# Patient Record
Sex: MALE | Race: WHITE | Employment: OTHER | URBAN - METROPOLITAN AREA
[De-identification: names, ages, dates, MRNs, and addresses within clinical notes are randomized per-mention and may not be internally consistent; named-entity substitution may affect disease eponyms.]

---

## 2017-09-07 ENCOUNTER — OFFICE VISIT (OUTPATIENT)
Dept: SURGERY | Age: 62
End: 2017-09-07

## 2017-09-07 VITALS
HEART RATE: 49 BPM | DIASTOLIC BLOOD PRESSURE: 64 MMHG | SYSTOLIC BLOOD PRESSURE: 151 MMHG | HEIGHT: 72 IN | BODY MASS INDEX: 42.39 KG/M2 | WEIGHT: 313 LBS

## 2017-09-07 DIAGNOSIS — E11.69 UNCONTROLLED TYPE 2 DIABETES MELLITUS WITH OTHER SPECIFIED COMPLICATION, UNSPECIFIED LONG TERM INSULIN USE STATUS: Primary | ICD-10-CM

## 2017-09-07 DIAGNOSIS — G47.33 OBSTRUCTIVE SLEEP APNEA SYNDROME: ICD-10-CM

## 2017-09-07 DIAGNOSIS — E11.65 UNCONTROLLED TYPE 2 DIABETES MELLITUS WITH OTHER SPECIFIED COMPLICATION, UNSPECIFIED LONG TERM INSULIN USE STATUS: Primary | ICD-10-CM

## 2017-09-07 DIAGNOSIS — I10 ESSENTIAL HYPERTENSION: ICD-10-CM

## 2017-09-07 LAB
GLUCOSE BLD-MCNC: 130 MG/DL
HBA1C MFR BLD: 7.8 %

## 2017-09-07 PROCEDURE — 83036 HEMOGLOBIN GLYCOSYLATED A1C: CPT | Performed by: INTERNAL MEDICINE

## 2017-09-07 PROCEDURE — 99203 OFFICE O/P NEW LOW 30 MIN: CPT | Performed by: INTERNAL MEDICINE

## 2017-09-07 PROCEDURE — 3017F COLORECTAL CA SCREEN DOC REV: CPT | Performed by: INTERNAL MEDICINE

## 2017-09-07 PROCEDURE — 3046F HEMOGLOBIN A1C LEVEL >9.0%: CPT | Performed by: INTERNAL MEDICINE

## 2017-09-07 PROCEDURE — 1036F TOBACCO NON-USER: CPT | Performed by: INTERNAL MEDICINE

## 2017-09-07 PROCEDURE — G8427 DOCREV CUR MEDS BY ELIG CLIN: HCPCS | Performed by: INTERNAL MEDICINE

## 2017-09-07 PROCEDURE — G8417 CALC BMI ABV UP PARAM F/U: HCPCS | Performed by: INTERNAL MEDICINE

## 2017-09-07 PROCEDURE — 82962 GLUCOSE BLOOD TEST: CPT | Performed by: INTERNAL MEDICINE

## 2017-09-07 RX ORDER — FLUTICASONE PROPIONATE 50 MCG
1 SPRAY, SUSPENSION (ML) NASAL NIGHTLY
COMMUNITY

## 2017-09-07 RX ORDER — SENNA PLUS 8.6 MG/1
1 TABLET ORAL 3 TIMES DAILY
Status: ON HOLD | COMMUNITY
End: 2020-10-18 | Stop reason: ALTCHOICE

## 2017-09-07 RX ORDER — HYDROCHLOROTHIAZIDE 25 MG/1
25 TABLET ORAL 2 TIMES DAILY
COMMUNITY
End: 2019-10-04 | Stop reason: SDUPTHER

## 2017-09-10 ASSESSMENT — ENCOUNTER SYMPTOMS
EYES NEGATIVE: 1
VISUAL CHANGE: 0

## 2017-09-28 RX ORDER — CALCIUM CARB/VITAMIN D3/VIT K1 500-100-40
TABLET,CHEWABLE ORAL
Qty: 300 EACH | Refills: 3 | Status: SHIPPED | OUTPATIENT
Start: 2017-09-28 | End: 2019-10-07 | Stop reason: SDUPTHER

## 2017-10-11 DIAGNOSIS — Z79.4 TYPE 2 DIABETES MELLITUS WITHOUT COMPLICATION, WITH LONG-TERM CURRENT USE OF INSULIN (HCC): ICD-10-CM

## 2017-10-11 DIAGNOSIS — I10 HYPERTENSION: ICD-10-CM

## 2017-10-11 DIAGNOSIS — E11.9 TYPE 2 DIABETES MELLITUS WITHOUT COMPLICATION, WITH LONG-TERM CURRENT USE OF INSULIN (HCC): ICD-10-CM

## 2017-11-14 ENCOUNTER — OFFICE VISIT (OUTPATIENT)
Dept: SURGERY | Age: 62
End: 2017-11-14

## 2017-11-14 VITALS
HEIGHT: 72 IN | WEIGHT: 315 LBS | BODY MASS INDEX: 42.66 KG/M2 | SYSTOLIC BLOOD PRESSURE: 185 MMHG | HEART RATE: 56 BPM | DIASTOLIC BLOOD PRESSURE: 76 MMHG

## 2017-11-14 DIAGNOSIS — E11.65 UNCONTROLLED TYPE 2 DIABETES MELLITUS WITH OTHER SPECIFIED COMPLICATION, UNSPECIFIED LONG TERM INSULIN USE STATUS: ICD-10-CM

## 2017-11-14 DIAGNOSIS — E11.69 UNCONTROLLED TYPE 2 DIABETES MELLITUS WITH OTHER SPECIFIED COMPLICATION, UNSPECIFIED LONG TERM INSULIN USE STATUS: ICD-10-CM

## 2017-11-14 DIAGNOSIS — G47.33 OBSTRUCTIVE SLEEP APNEA SYNDROME: ICD-10-CM

## 2017-11-14 LAB
ANION GAP SERPL CALCULATED.3IONS-SCNC: 12 MEQ/L (ref 7–13)
BUN BLDV-MCNC: 21 MG/DL (ref 8–23)
CALCIUM SERPL-MCNC: 10.1 MG/DL (ref 8.6–10.2)
CHLORIDE BLD-SCNC: 99 MEQ/L (ref 98–107)
CO2: 22 MEQ/L (ref 22–29)
CREAT SERPL-MCNC: 0.84 MG/DL (ref 0.7–1.2)
GFR AFRICAN AMERICAN: >60
GFR NON-AFRICAN AMERICAN: >60
GLUCOSE BLD-MCNC: 266 MG/DL (ref 74–109)
GLUCOSE BLD-MCNC: 291 MG/DL
POTASSIUM SERPL-SCNC: 4.8 MEQ/L (ref 3.5–5.1)
SODIUM BLD-SCNC: 133 MEQ/L (ref 132–144)

## 2017-11-14 PROCEDURE — G8427 DOCREV CUR MEDS BY ELIG CLIN: HCPCS | Performed by: INTERNAL MEDICINE

## 2017-11-14 PROCEDURE — G8417 CALC BMI ABV UP PARAM F/U: HCPCS | Performed by: INTERNAL MEDICINE

## 2017-11-14 PROCEDURE — 3045F PR MOST RECENT HEMOGLOBIN A1C LEVEL 7.0-9.0%: CPT | Performed by: INTERNAL MEDICINE

## 2017-11-14 PROCEDURE — 99213 OFFICE O/P EST LOW 20 MIN: CPT | Performed by: INTERNAL MEDICINE

## 2017-11-14 PROCEDURE — 1036F TOBACCO NON-USER: CPT | Performed by: INTERNAL MEDICINE

## 2017-11-14 PROCEDURE — 82962 GLUCOSE BLOOD TEST: CPT | Performed by: INTERNAL MEDICINE

## 2017-11-14 PROCEDURE — 3017F COLORECTAL CA SCREEN DOC REV: CPT | Performed by: INTERNAL MEDICINE

## 2017-11-14 PROCEDURE — G8484 FLU IMMUNIZE NO ADMIN: HCPCS | Performed by: INTERNAL MEDICINE

## 2017-11-14 NOTE — PROGRESS NOTES
tablet, Take 25 mg by mouth 2 times daily, Disp: , Rfl:     senna (SENOKOT) 8.6 MG tablet, Take 1 tablet by mouth 3 times daily, Disp: , Rfl:     insulin NPH (HUMULIN N) 100 UNIT/ML injection vial, 70 units at bedtime, Disp: 3 vial, Rfl: 3    insulin regular (HUMULIN R) 100 UNIT/ML injection, 20 units at each meals, Disp: 4 vial, Rfl: 3    metFORMIN (GLUCOPHAGE) 1000 MG tablet, TAKE 1 TABLET BY MOUTH TWICE A DAY WITH MEALS , Disp: 60 tablet, Rfl: 4    ACCU-CHEK SMARTVIEW strip, Use to test 4 times daily. , Disp: 150 each, Rfl: 4    tamsulosin (FLOMAX) 0.4 MG capsule, Take 0.4 mg by mouth daily. , Disp: , Rfl:     allopurinol (ZYLOPRIM) 100 MG tablet, Take 100 mg by mouth daily. , Disp: , Rfl:     docusate sodium (COLACE) 100 MG capsule, Take 100 mg by mouth 2 times daily. , Disp: , Rfl:     vitamin D (ERGOCALCIFEROL) 92960 UNITS CAPS capsule, Take 50,000 Units by mouth once a week., Disp: , Rfl:     metoprolol (TOPROL-XL) 25 MG XL tablet, Take 25 mg by mouth daily. , Disp: , Rfl:     ACCU-CHEK FASTCLIX LANCETS MISC, Pt test 4x daily, Disp: 400 each, Rfl: 11    lisinopril (PRINIVIL;ZESTRIL) 20 MG tablet, Take 1 tablet by mouth daily. , Disp: 30 tablet, Rfl: 06    potassium chloride (KLOR-CON) 10 MEQ CR tablet, Take 10 mEq by mouth daily. , Disp: , Rfl:     sildenafil (VIAGRA) 50 MG tablet, Take 50 mg by mouth as needed. , Disp: , Rfl:     Review of Systems   Constitutional: Positive for fatigue. Genitourinary: Positive for impotence. Musculoskeletal: Positive for arthralgias. Neurological: Positive for dizziness. Psychiatric/Behavioral: Positive for sleep disturbance. The patient is nervous/anxious. All other systems reviewed and are negative.       Vitals:    11/14/17 1511 11/14/17 1519   BP: (!) 179/80 (!) 185/76   Site: Right Arm    Position: Sitting    Cuff Size: Large Adult    Pulse: 56    Weight: (!) 317 lb (143.8 kg)    Height: 6' (1.829 m)          Objective:   Physical Exam Constitutional: He is oriented to person, place, and time. He appears well-developed and well-nourished. HENT:   Head: Normocephalic and atraumatic. Cardiovascular: Normal rate. Abdominal:   Obese    Musculoskeletal:   Normal rom upper and lower extremity    Neurological: He is alert and oriented to person, place, and time. Skin: Skin is warm and dry. Psychiatric: He has a normal mood and affect. His behavior is normal.     Lab Results   Component Value Date     2017    K 4.8 2017    CL 99 2017    CO2 22 2017    BUN 21 2017    CREATININE 0.84 2017    GLUCOSE 291 2017    CALCIUM 10.1 2017    PROT 6.7 2013    LABALBU 4.1 2013    BILITOT 0.4 2013    ALKPHOS 80 2013    AST 16 2013    ALT 23 2013    LABGLOM >60.0 2017    GFRAA >60.0 2017    GLOB 2.5 2012         Assessment:      1.  Uncontrolled type 2 diabetes mellitus with other specified complication, with long-term current use of insulin ([facility] [address]  POCT Glucose           Plan:      Orders Placed This Encounter   Procedures    Basic Metabolic Panel     Standing Status:   Future     Standing Expiration Date:   2018    Hemoglobin A1C     Standing Status:   Future     Standing Expiration Date:   2018    Ambulatory referral to Pulmonology     Referral Priority:   Routine     Referral Type:   Consult for Advice and Opinion     Referral Reason:   Specialty Services Required     Referred to Provider:   Sanjana Rodriguez MD     Requested Specialty:   Pulmonology     Number of Visits Requested:   1    POCT Glucose     Increase insulin dose   Orders Placed This Encounter   Medications    insulin regular (NOVOLIN R) 100 UNIT/ML injection     Sig: Inject 30 units tid please give 6 vials for a 90 day supply     Dispense:  6 vial     Refill:  3    Insulin Degludec (TRESIBA FLEXTOUCH) 200 UNIT/ML SOPN     Si-100 units at bedtime     Dispense:  30 Pen     Refill:  03     continue  metformin 100 mg bid

## 2018-02-12 LAB
ANION GAP SERPL CALCULATED.3IONS-SCNC: 12 MEQ/L (ref 7–13)
BUN BLDV-MCNC: 24 MG/DL (ref 8–23)
CALCIUM SERPL-MCNC: 10.1 MG/DL (ref 8.6–10.2)
CHLORIDE BLD-SCNC: 103 MEQ/L (ref 98–107)
CO2: 24 MEQ/L (ref 22–29)
CREAT SERPL-MCNC: 0.95 MG/DL (ref 0.7–1.2)
GFR AFRICAN AMERICAN: >60
GFR NON-AFRICAN AMERICAN: >60
GLUCOSE BLD-MCNC: 114 MG/DL (ref 74–109)
HBA1C MFR BLD: 7.3 % (ref 4.8–5.9)
POTASSIUM SERPL-SCNC: 4.5 MEQ/L (ref 3.5–5.1)
SODIUM BLD-SCNC: 139 MEQ/L (ref 132–144)

## 2018-02-13 ENCOUNTER — OFFICE VISIT (OUTPATIENT)
Dept: ENDOCRINOLOGY | Age: 63
End: 2018-02-13
Payer: COMMERCIAL

## 2018-02-13 VITALS
BODY MASS INDEX: 42.66 KG/M2 | HEIGHT: 72 IN | DIASTOLIC BLOOD PRESSURE: 80 MMHG | SYSTOLIC BLOOD PRESSURE: 166 MMHG | HEART RATE: 60 BPM | WEIGHT: 315 LBS

## 2018-02-13 DIAGNOSIS — E29.1 HYPOGONADISM MALE: ICD-10-CM

## 2018-02-13 DIAGNOSIS — E11.42 DIABETIC POLYNEUROPATHY ASSOCIATED WITH TYPE 2 DIABETES MELLITUS (HCC): ICD-10-CM

## 2018-02-13 DIAGNOSIS — D35.01 ADENOMA OF RIGHT ADRENAL GLAND: ICD-10-CM

## 2018-02-13 DIAGNOSIS — E66.01 MORBID OBESITY (HCC): ICD-10-CM

## 2018-02-13 LAB — GLUCOSE BLD-MCNC: 169 MG/DL

## 2018-02-13 PROCEDURE — 3045F PR MOST RECENT HEMOGLOBIN A1C LEVEL 7.0-9.0%: CPT | Performed by: INTERNAL MEDICINE

## 2018-02-13 PROCEDURE — G8484 FLU IMMUNIZE NO ADMIN: HCPCS | Performed by: INTERNAL MEDICINE

## 2018-02-13 PROCEDURE — G8417 CALC BMI ABV UP PARAM F/U: HCPCS | Performed by: INTERNAL MEDICINE

## 2018-02-13 PROCEDURE — 99214 OFFICE O/P EST MOD 30 MIN: CPT | Performed by: INTERNAL MEDICINE

## 2018-02-13 PROCEDURE — 1036F TOBACCO NON-USER: CPT | Performed by: INTERNAL MEDICINE

## 2018-02-13 PROCEDURE — 3017F COLORECTAL CA SCREEN DOC REV: CPT | Performed by: INTERNAL MEDICINE

## 2018-02-13 PROCEDURE — G8427 DOCREV CUR MEDS BY ELIG CLIN: HCPCS | Performed by: INTERNAL MEDICINE

## 2018-02-13 PROCEDURE — 82962 GLUCOSE BLOOD TEST: CPT | Performed by: INTERNAL MEDICINE

## 2018-02-13 RX ORDER — GABAPENTIN 100 MG/1
300 CAPSULE ORAL 3 TIMES DAILY
Qty: 90 CAPSULE | Refills: 3 | Status: SHIPPED | OUTPATIENT
Start: 2018-02-13 | End: 2018-06-22 | Stop reason: SDUPTHER

## 2018-02-13 RX ORDER — GABAPENTIN 100 MG/1
200 CAPSULE ORAL 2 TIMES DAILY
COMMUNITY
End: 2018-02-13 | Stop reason: SDUPTHER

## 2018-02-13 NOTE — PROGRESS NOTES
Subjective:      Patient ID: Jono Sutton is a 57 y.o. male. Diabetes   He presents for his follow-up diabetic visit. He has type 2 diabetes mellitus. His disease course has been worsening. Hypoglycemia symptoms include dizziness and nervousness/anxiousness. Symptoms are worsening. Diabetic complications include impotence. Risk factors for coronary artery disease include diabetes mellitus, obesity, male sex and sedentary lifestyle. Current diabetic treatment includes insulin injections and oral agent (monotherapy) (treshiba novolin r plus metformin ). He is compliant with treatment most of the time (high cost). He is currently taking insulin pre-breakfast, pre-lunch, pre-dinner and at bedtime. Insulin injections are given by patient. He participates in exercise intermittently. His breakfast blood glucose range is generally >200 mg/dl. His highest blood glucose is >200 mg/dl. His overall blood glucose range is 180-200 mg/dl. (Lab Results       Component                Value               Date                       LABA1C                   7.3 (H)             02/12/2018              ) An ACE inhibitor/angiotensin II receptor blocker is being taken. Pt c/o increased sweating     Hypogonadism not on testosterone replacement        3.9 cm rt adrenal adenoma being followed by [facility] seen urologist     IMPRESSION:   1.  Nonobstructing bilateral renal stones. 2.  No evidence of obstructive renal stones, enhancing renal mass,   hydronephrosis or hydroureter. 3.  Small right renal cyst.  4.  Nonspecific mild diffuse wall thickening may represent a combination   of underdistention, muscular hypertrophy and infectious/inflammatory   etiologies. 5.  Fatty infiltration of the liver. 6.  3.9 cm right adrenal mass, most likely a lipid poor adrenal adenoma,   stable since 8/5/13.     Patient Active Problem List   Diagnosis    Type II diabetes mellitus, uncontrolled ([address]    Hypogonadism male   [facility] disturbance. The patient is nervous/anxious. All other systems reviewed and are negative. Vitals:    02/13/18 1609 02/13/18 1613   BP: (!) 180/66 (!) 166/80   Site: Left Arm Right Arm   Position: Sitting Sitting   Cuff Size: Large Adult Large Adult   Pulse: 60    Weight: (!) 315 lb (142.9 kg)    Height: 6' (1.829 m)          Objective:   Physical Exam   Constitutional: He appears well-developed and well-nourished. HENT:   Head: Normocephalic and atraumatic. Cardiovascular: Normal rate. Pulmonary/Chest: Effort normal.   Abdominal:   Obese    Neurological: He is alert. Skin: Skin is warm and dry. Psychiatric: He has a normal mood and affect. His behavior is normal.     Lab Results   Component Value Date     02/12/2018    K 4.5 02/12/2018     02/12/2018    CO2 24 02/12/2018    BUN 24 (H) 02/12/2018    CREATININE 0.95 02/12/2018    GLUCOSE 169 02/13/2018    CALCIUM 10.1 02/12/2018    PROT 6.7 04/16/2013    LABALBU 4.1 04/16/2013    BILITOT 0.4 04/16/2013    ALKPHOS 80 04/16/2013    AST 16 04/16/2013    ALT 23 04/16/2013    LABGLOM >60.0 02/12/2018    GFRAA >60.0 02/12/2018    GLOB 2.5 02/16/2012         Assessment:      1. Uncontrolled type 2 diabetes mellitus with other specified complication, with long-term current use of insulin ([facility])  POCT Glucose    Basic Metabolic Panel    Hemoglobin A1C   2. Hypogonadism male  Testosterone Free And Total Male   3. Morbid obesity ([address]     4. Diabetic polyneuropathy associated with type 2 diabetes mellitus ([address]     5.  Adenoma of right adrenal gland  Cortisol, Urine, Free    Cortisol Am, Total    Aldosterone & Renin, Direct With Ratio    Metanephrines Plasma Free    CT ABDOMEN PELVIS W IV CONTRAST Additional Contrast? Radiologist Recommendation           Plan:      Orders Placed This Encounter   Procedures    CT ABDOMEN PELVIS W IV CONTRAST Additional Contrast? Radiologist Recommendation     Standing Status:   Future     Standing Expiration Date:

## 2018-02-20 ENCOUNTER — TELEPHONE (OUTPATIENT)
Dept: PRIMARY CARE CLINIC | Age: 63
End: 2018-02-20

## 2018-02-20 DIAGNOSIS — D35.00 ADRENAL ADENOMA, UNSPECIFIED LATERALITY: Primary | ICD-10-CM

## 2018-02-20 NOTE — TELEPHONE ENCOUNTER
Roney Ryder is calling on behalf of the pt's insurance co. They have denied auth for the CT Abdomen/pelvis you ordered. They will approve a CT of the Abdomen only w/wo contrast. If you wish to proceed with their suggestion, please create the new order and call the contact number.

## 2018-02-21 ENCOUNTER — HOSPITAL ENCOUNTER (OUTPATIENT)
Dept: CT IMAGING | Age: 63
Discharge: HOME | End: 2018-02-23
Payer: COMMERCIAL

## 2018-02-21 DIAGNOSIS — D35.00 ADRENAL ADENOMA, UNSPECIFIED LATERALITY: ICD-10-CM

## 2018-02-21 PROCEDURE — 6360000004 HC RX CONTRAST MEDICATION: Performed by: INTERNAL MEDICINE

## 2018-02-21 PROCEDURE — 74160 CT ABDOMEN W/CONTRAST: CPT

## 2018-02-21 RX ADMIN — IOPAMIDOL 100 ML: 755 INJECTION, SOLUTION INTRAVENOUS at 13:51

## 2018-06-22 RX ORDER — GABAPENTIN 100 MG/1
300 CAPSULE ORAL 3 TIMES DAILY
Qty: 270 CAPSULE | Refills: 3 | Status: SHIPPED | OUTPATIENT
Start: 2018-06-22 | End: 2020-03-23 | Stop reason: SDUPTHER

## 2018-07-23 ENCOUNTER — OFFICE VISIT (OUTPATIENT)
Dept: ENDOCRINOLOGY | Age: 63
End: 2018-07-23
Payer: COMMERCIAL

## 2018-07-23 VITALS
WEIGHT: 308 LBS | DIASTOLIC BLOOD PRESSURE: 75 MMHG | HEART RATE: 54 BPM | BODY MASS INDEX: 41.72 KG/M2 | SYSTOLIC BLOOD PRESSURE: 168 MMHG | HEIGHT: 72 IN

## 2018-07-23 DIAGNOSIS — E83.52 HYPERCALCEMIA: ICD-10-CM

## 2018-07-23 DIAGNOSIS — E29.1 HYPOGONADISM MALE: ICD-10-CM

## 2018-07-23 DIAGNOSIS — D35.01 ADENOMA OF RIGHT ADRENAL GLAND: ICD-10-CM

## 2018-07-23 LAB
ANION GAP SERPL CALCULATED.3IONS-SCNC: 13 MEQ/L (ref 7–13)
BUN BLDV-MCNC: 22 MG/DL (ref 8–23)
CALCIUM SERPL-MCNC: 11 MG/DL (ref 8.6–10.2)
CHLORIDE BLD-SCNC: 102 MEQ/L (ref 98–107)
CO2: 24 MEQ/L (ref 22–29)
CORTISOL - AM: 17.5 UG/DL (ref 6.2–19.4)
CREAT SERPL-MCNC: 1.08 MG/DL (ref 0.7–1.2)
GFR AFRICAN AMERICAN: >60
GFR NON-AFRICAN AMERICAN: >60
GLUCOSE BLD-MCNC: 105 MG/DL
GLUCOSE BLD-MCNC: 184 MG/DL (ref 74–109)
HBA1C MFR BLD: 6.3 %
POTASSIUM SERPL-SCNC: 4.3 MEQ/L (ref 3.5–5.1)
SODIUM BLD-SCNC: 139 MEQ/L (ref 132–144)

## 2018-07-23 PROCEDURE — G8417 CALC BMI ABV UP PARAM F/U: HCPCS | Performed by: INTERNAL MEDICINE

## 2018-07-23 PROCEDURE — G8427 DOCREV CUR MEDS BY ELIG CLIN: HCPCS | Performed by: INTERNAL MEDICINE

## 2018-07-23 PROCEDURE — 83036 HEMOGLOBIN GLYCOSYLATED A1C: CPT | Performed by: INTERNAL MEDICINE

## 2018-07-23 PROCEDURE — 3044F HG A1C LEVEL LT 7.0%: CPT | Performed by: INTERNAL MEDICINE

## 2018-07-23 PROCEDURE — 82962 GLUCOSE BLOOD TEST: CPT | Performed by: INTERNAL MEDICINE

## 2018-07-23 PROCEDURE — 99213 OFFICE O/P EST LOW 20 MIN: CPT | Performed by: INTERNAL MEDICINE

## 2018-07-23 PROCEDURE — 1036F TOBACCO NON-USER: CPT | Performed by: INTERNAL MEDICINE

## 2018-07-23 PROCEDURE — 3017F COLORECTAL CA SCREEN DOC REV: CPT | Performed by: INTERNAL MEDICINE

## 2018-07-23 PROCEDURE — 2022F DILAT RTA XM EVC RTNOPTHY: CPT | Performed by: INTERNAL MEDICINE

## 2018-07-24 LAB
SEX HORMONE BINDING GLOBULIN: 62 NMOL/L (ref 11–80)
TESTOSTERONE FREE PERCENT: 1.2 % (ref 1.6–2.9)
TESTOSTERONE FREE, CALC: 41 PG/ML (ref 47–244)
TESTOSTERONE TOTAL-MALE: 334 NG/DL (ref 300–720)

## 2018-07-25 LAB
CORTISOL (UR), FREE: NORMAL UG/D
CORTISOL URINE, FREE (/G CRT): 50.28 UG/G CRT
CORTISOL,F,UG/L,U: 53.3 UG/L
CREATININE 24 HOUR URINE: NORMAL MG/D (ref 800–2100)
CREATININE URINE: 106 MG/DL
HOURS COLLECTED: NORMAL HR
INTERPRETATION, OPI7M: NORMAL
URINE TOTAL VOLUME: NORMAL ML

## 2018-07-26 LAB
METANEPH/PLASMA INTERP: NORMAL
METANEPHRINE FREE PLASMA: <0.1 NMOL/L (ref 0–0.49)
NORMETANEPHRINE FREE PLASMA: 0.32 NMOL/L (ref 0–0.89)

## 2018-07-27 LAB
ALDOSTERONE/DIRECT RENIN CALCULATION: 0.4 RATIO (ref 0.1–3.7)
ALDOSTERONE: 5.2 NG/DL
RENIN PLASMA: 11.7 PG/ML (ref 2.5–45.7)

## 2018-08-07 DIAGNOSIS — I15.9 SECONDARY HYPERTENSION: ICD-10-CM

## 2018-08-07 DIAGNOSIS — Z79.4 TYPE 2 DIABETES MELLITUS WITHOUT COMPLICATION, WITH LONG-TERM CURRENT USE OF INSULIN (HCC): ICD-10-CM

## 2018-08-07 DIAGNOSIS — E11.9 TYPE 2 DIABETES MELLITUS WITHOUT COMPLICATION, WITH LONG-TERM CURRENT USE OF INSULIN (HCC): ICD-10-CM

## 2019-01-09 ENCOUNTER — TELEPHONE (OUTPATIENT)
Dept: INTERNAL MEDICINE CLINIC | Age: 64
End: 2019-01-09

## 2019-02-12 ENCOUNTER — TELEPHONE (OUTPATIENT)
Dept: ENDOCRINOLOGY | Age: 64
End: 2019-02-12

## 2019-02-13 ENCOUNTER — TELEPHONE (OUTPATIENT)
Dept: ENDOCRINOLOGY | Age: 64
End: 2019-02-13

## 2019-03-06 DIAGNOSIS — E83.52 HYPERCALCEMIA: ICD-10-CM

## 2019-03-06 LAB — PARATHYROID HORMONE INTACT: 76.3 PG/ML (ref 15–65)

## 2019-03-07 ENCOUNTER — OFFICE VISIT (OUTPATIENT)
Dept: ENDOCRINOLOGY | Age: 64
End: 2019-03-07
Payer: MEDICARE

## 2019-03-07 VITALS
HEIGHT: 72 IN | DIASTOLIC BLOOD PRESSURE: 74 MMHG | WEIGHT: 305 LBS | OXYGEN SATURATION: 97 % | HEART RATE: 47 BPM | SYSTOLIC BLOOD PRESSURE: 162 MMHG | BODY MASS INDEX: 41.31 KG/M2

## 2019-03-07 DIAGNOSIS — R00.1 BRADYCARDIA: ICD-10-CM

## 2019-03-07 DIAGNOSIS — E11.9 TYPE 2 DIABETES MELLITUS WITHOUT COMPLICATION, WITH LONG-TERM CURRENT USE OF INSULIN (HCC): Primary | ICD-10-CM

## 2019-03-07 DIAGNOSIS — Z79.4 TYPE 2 DIABETES MELLITUS WITHOUT COMPLICATION, WITH LONG-TERM CURRENT USE OF INSULIN (HCC): Primary | ICD-10-CM

## 2019-03-07 LAB
GLUCOSE BLD-MCNC: 156 MG/DL
HBA1C MFR BLD: 7.8 %

## 2019-03-07 PROCEDURE — 83036 HEMOGLOBIN GLYCOSYLATED A1C: CPT | Performed by: INTERNAL MEDICINE

## 2019-03-07 PROCEDURE — 99213 OFFICE O/P EST LOW 20 MIN: CPT | Performed by: INTERNAL MEDICINE

## 2019-03-07 PROCEDURE — 82962 GLUCOSE BLOOD TEST: CPT | Performed by: INTERNAL MEDICINE

## 2019-06-17 ENCOUNTER — TELEPHONE (OUTPATIENT)
Dept: ENDOCRINOLOGY | Age: 64
End: 2019-06-17

## 2019-09-26 ENCOUNTER — OFFICE VISIT (OUTPATIENT)
Dept: ENDOCRINOLOGY | Age: 64
End: 2019-09-26
Payer: MEDICARE

## 2019-09-26 VITALS
WEIGHT: 314 LBS | BODY MASS INDEX: 42.53 KG/M2 | SYSTOLIC BLOOD PRESSURE: 135 MMHG | HEIGHT: 72 IN | DIASTOLIC BLOOD PRESSURE: 68 MMHG | HEART RATE: 59 BPM

## 2019-09-26 DIAGNOSIS — Z79.4 TYPE 2 DIABETES MELLITUS WITHOUT COMPLICATION, WITH LONG-TERM CURRENT USE OF INSULIN (HCC): Primary | ICD-10-CM

## 2019-09-26 DIAGNOSIS — E11.9 TYPE 2 DIABETES MELLITUS WITHOUT COMPLICATION, WITH LONG-TERM CURRENT USE OF INSULIN (HCC): Primary | ICD-10-CM

## 2019-09-26 LAB
CHP ED QC CHECK: NORMAL
GLUCOSE BLD-MCNC: 293 MG/DL
HBA1C MFR BLD: 8.3 %

## 2019-09-26 PROCEDURE — 83036 HEMOGLOBIN GLYCOSYLATED A1C: CPT | Performed by: INTERNAL MEDICINE

## 2019-09-26 PROCEDURE — 99213 OFFICE O/P EST LOW 20 MIN: CPT | Performed by: INTERNAL MEDICINE

## 2019-09-26 PROCEDURE — 82962 GLUCOSE BLOOD TEST: CPT | Performed by: INTERNAL MEDICINE

## 2019-09-26 RX ORDER — NIFEDIPINE 90 MG/1
90 TABLET, EXTENDED RELEASE ORAL
Status: ON HOLD | COMMUNITY
Start: 2019-09-19 | End: 2021-05-18

## 2019-10-04 DIAGNOSIS — Z79.4 TYPE 2 DIABETES MELLITUS WITHOUT COMPLICATION, WITH LONG-TERM CURRENT USE OF INSULIN (HCC): ICD-10-CM

## 2019-10-04 DIAGNOSIS — E11.9 TYPE 2 DIABETES MELLITUS WITHOUT COMPLICATION, WITH LONG-TERM CURRENT USE OF INSULIN (HCC): ICD-10-CM

## 2019-10-04 DIAGNOSIS — I15.9 SECONDARY HYPERTENSION: ICD-10-CM

## 2019-10-07 RX ORDER — CALCIUM CARB/VITAMIN D3/VIT K1 500-100-40
TABLET,CHEWABLE ORAL
Qty: 300 EACH | Refills: 3 | Status: SHIPPED | OUTPATIENT
Start: 2019-10-07 | End: 2019-12-04 | Stop reason: SDUPTHER

## 2019-10-07 RX ORDER — HYDROCHLOROTHIAZIDE 25 MG/1
25 TABLET ORAL 2 TIMES DAILY
Qty: 60 TABLET | Refills: 3 | Status: SHIPPED | OUTPATIENT
Start: 2019-10-07 | End: 2020-03-23

## 2019-11-18 ENCOUNTER — TELEPHONE (OUTPATIENT)
Dept: ENDOCRINOLOGY | Age: 64
End: 2019-11-18

## 2019-12-03 DIAGNOSIS — E11.9 TYPE 2 DIABETES MELLITUS WITHOUT COMPLICATION, WITH LONG-TERM CURRENT USE OF INSULIN (HCC): ICD-10-CM

## 2019-12-03 DIAGNOSIS — I15.9 SECONDARY HYPERTENSION: ICD-10-CM

## 2019-12-03 DIAGNOSIS — Z79.4 TYPE 2 DIABETES MELLITUS WITHOUT COMPLICATION, WITH LONG-TERM CURRENT USE OF INSULIN (HCC): ICD-10-CM

## 2019-12-04 RX ORDER — CALCIUM CARB/VITAMIN D3/VIT K1 500-100-40
TABLET,CHEWABLE ORAL
Qty: 300 EACH | Refills: 3 | Status: SHIPPED | OUTPATIENT
Start: 2019-12-04

## 2020-03-23 ENCOUNTER — VIRTUAL VISIT (OUTPATIENT)
Dept: ENDOCRINOLOGY | Age: 65
End: 2020-03-23

## 2020-03-23 PROCEDURE — 99442 PR PHYS/QHP TELEPHONE EVALUATION 11-20 MIN: CPT | Performed by: INTERNAL MEDICINE

## 2020-03-23 RX ORDER — HYDROCHLOROTHIAZIDE 25 MG/1
TABLET ORAL
Qty: 180 TABLET | Refills: 1 | Status: SHIPPED | OUTPATIENT
Start: 2020-03-23 | End: 2020-09-22

## 2020-03-23 RX ORDER — GABAPENTIN 100 MG/1
300 CAPSULE ORAL 3 TIMES DAILY
Qty: 270 CAPSULE | Refills: 3 | Status: SHIPPED | OUTPATIENT
Start: 2020-03-23 | End: 2021-01-18

## 2020-03-23 NOTE — PROGRESS NOTES
Subjective:      Patient ID: Kiran Katz is a 75 y.o. male. 6 months follow-up on type 2 diabetes this is a telephone visit advised patient this is a billable visit blood sugars have been high as due to lack of activity and current situation and they have been staying in the low to mid 200 range patient has not been taking Trulicity only on Tresiba and regular insulin  Diabetes   He presents for his follow-up diabetic visit. He has type 2 diabetes mellitus. Symptoms are stable. Current diabetic treatment includes insulin injections Jaimie Form plus Novolin R). He is currently taking insulin pre-breakfast, pre-lunch, pre-dinner and at bedtime. His overall blood glucose range is >200 mg/dl. Patient Active Problem List   Diagnosis    Type II diabetes mellitus, uncontrolled ([address]    Hypogonadism male    Hypertension    Obesity     No Known Allergies      Current Outpatient Medications:     gabapentin (NEURONTIN) 100 MG capsule, Take 3 capsules by mouth 3 times daily for 90 days. , Disp: 270 capsule, Rfl: 3    insulin regular (NOVOLIN R) 100 UNIT/ML injection, Inject 30 units tid please give 6 vials for a 90 day supply, Disp: 6 vial, Rfl: 3    blood glucose test strips (ASCENSIA AUTODISC VI;ONE TOUCH ULTRA TEST VI) strip, 1 each by In Vitro route 4 times daily DX: E11.65, IDDM (please dispense One Touch Verio brand), Disp: 400 each, Rfl: 3    Insulin Pen Needle (B-D ULTRAFINE III SHORT PEN) 31G X 8 MM MISC, 3 TIMES A DAY, Disp: 300 each, Rfl: 3    Insulin Syringe-Needle U-100 (INSULIN SYRINGE .3CC/31GX5/16\") 31G X 5/16\" 0.3 ML MISC, Use 3 daily with insulin, Disp: 300 each, Rfl: 3    metFORMIN (GLUCOPHAGE) 1000 MG tablet, TAKE 1 TABLET TWICE A DAY, Disp: 180 tablet, Rfl: 1    hydrochlorothiazide (HYDRODIURIL) 25 MG tablet, Take 1 tablet by mouth 2 times daily, Disp: 60 tablet, Rfl: 3    NIFEdipine (PROCARDIA XL) 90 MG extended release tablet, Take 90 mg by mouth, Disp: , Rfl:     Dulaglutide (TRULICITY) 7.88 LQ/7.7UQ SOPN, Inject 0.75 mg into the skin once a week, Disp: 4 pen, Rfl: 1    Insulin Degludec (TRESIBA FLEXTOUCH) 200 UNIT/ML SOPN,  units at bedtime, Disp: 30 pen, Rfl: 03    fluticasone (FLONASE) 50 MCG/ACT nasal spray, 1 spray by Nasal route daily, Disp: , Rfl:     senna (SENOKOT) 8.6 MG tablet, Take 1 tablet by mouth 3 times daily, Disp: , Rfl:     tamsulosin (FLOMAX) 0.4 MG capsule, Take 0.4 mg by mouth daily. , Disp: , Rfl:     docusate sodium (COLACE) 100 MG capsule, Take 100 mg by mouth 2 times daily. , Disp: , Rfl:     vitamin D (ERGOCALCIFEROL) 67746 UNITS CAPS capsule, Take 50,000 Units by mouth once a week., Disp: , Rfl:     metoprolol (TOPROL-XL) 25 MG XL tablet, Take 25 mg by mouth daily. , Disp: , Rfl:     ACCU-CHEK FASTCLIX LANCETS MISC, Pt test 4x daily, Disp: 400 each, Rfl: 11    lisinopril (PRINIVIL;ZESTRIL) 20 MG tablet, Take 1 tablet by mouth daily. , Disp: 30 tablet, Rfl: 06    sildenafil (VIAGRA) 50 MG tablet, Take 50 mg by mouth as needed. , Disp: , Rfl:     Review of Systems    Objective:   Physical Exam    Assessment:       Diagnosis Orders   1. Type 2 diabetes mellitus without complication, with long-term current use of insulin ([facility])  Basic Metabolic Panel    Hemoglobin F9A    Basic Metabolic Panel    Hemoglobin A1C           Plan:      Orders Placed This Encounter   Procedures    Basic Metabolic Panel     Standing Status:   Future     Standing Expiration Date:   3/23/2021    Hemoglobin A1C     Standing Status:   Future     Standing Expiration Date:   3/23/2021    Basic Metabolic Panel     Standing Status:   Future     Standing Expiration Date:   3/23/2021    Hemoglobin A1C     Standing Status:   Future     Standing Expiration Date:   3/23/2021     Orders Placed This Encounter   Medications    gabapentin (NEURONTIN) 100 MG capsule     Sig: Take 3 capsules by mouth 3 times daily for 90 days.      Dispense:  270 capsule     Refill:  3   [facility]   units at bedtime plus regular insulin 30 units with each each meals patient to have repeat labs in 3 months time in September  Total time spent was 16 minutes          Catrina Ramos MD

## 2020-06-29 ENCOUNTER — VIRTUAL VISIT (OUTPATIENT)
Dept: ENDOCRINOLOGY | Age: 65
End: 2020-06-29

## 2020-06-29 PROCEDURE — 99213 OFFICE O/P EST LOW 20 MIN: CPT | Performed by: INTERNAL MEDICINE

## 2020-06-29 NOTE — PROGRESS NOTES
strips (ASCENSIA AUTODISC VI;ONE TOUCH ULTRA TEST VI) strip, 1 each by In Vitro route 4 times daily DX: E11.65, IDDM (please dispense One Touch Verio brand), Disp: 400 each, Rfl: 3    Insulin Pen Needle (B-D ULTRAFINE III SHORT PEN) 31G X 8 MM MISC, 3 TIMES A DAY, Disp: 300 each, Rfl: 3    Insulin Syringe-Needle U-100 (INSULIN SYRINGE .3CC/31GX5/16\") 31G X 5/16\" 0.3 ML MISC, Use 3 daily with insulin, Disp: 300 each, Rfl: 3    NIFEdipine (PROCARDIA XL) 90 MG extended release tablet, Take 90 mg by mouth, Disp: , Rfl:     Dulaglutide (TRULICITY) 2.45 TU/6.7PG SOPN, Inject 0.75 mg into the skin once a week, Disp: 4 pen, Rfl: 1    Insulin Degludec (TRESIBA FLEXTOUCH) 200 UNIT/ML SOPN,  units at bedtime, Disp: 30 pen, Rfl: 03    fluticasone (FLONASE) 50 MCG/ACT nasal spray, 1 spray by Nasal route daily, Disp: , Rfl:     senna (SENOKOT) 8.6 MG tablet, Take 1 tablet by mouth 3 times daily, Disp: , Rfl:     tamsulosin (FLOMAX) 0.4 MG capsule, Take 0.4 mg by mouth daily. , Disp: , Rfl:     docusate sodium (COLACE) 100 MG capsule, Take 100 mg by mouth 2 times daily. , Disp: , Rfl:     vitamin D (ERGOCALCIFEROL) 62138 UNITS CAPS capsule, Take 50,000 Units by mouth once a week., Disp: , Rfl:     metoprolol (TOPROL-XL) 25 MG XL tablet, Take 25 mg by mouth daily. , Disp: , Rfl:     ACCU-CHEK FASTCLIX LANCETS MISC, Pt test 4x daily, Disp: 400 each, Rfl: 11    lisinopril (PRINIVIL;ZESTRIL) 20 MG tablet, Take 1 tablet by mouth daily. , Disp: 30 tablet, Rfl: 06    sildenafil (VIAGRA) 50 MG tablet, Take 50 mg by mouth as needed. , Disp: , Rfl:     Review of Systems   Cardiovascular: Positive for leg swelling. All other systems reviewed and are negative. Prior to Visit Medications    Medication Sig Taking?  Authorizing Provider   metFORMIN (GLUCOPHAGE) 1000 MG tablet TAKE 1 TABLET BY MOUTH TWICE A DAY  Isma Good MD   hydroCHLOROthiazide (HYDRODIURIL) 25 MG tablet TAKE 1 TABLET BY MOUTH TWICE A DAY  Isma Chen MD   gabapentin (NEURONTIN) 100 MG capsule Take 3 capsules by mouth 3 times daily for 90 days. Kodi Obando MD   insulin regular (NOVOLIN R) 100 UNIT/ML injection Inject 30 units tid please give 6 vials for a 90 day supply  Kodi Obando MD   blood glucose test strips (ASCENSIA AUTODISC VI;ONE TOUCH ULTRA TEST VI) strip 1 each by In Vitro route 4 times daily DX: E11.65, IDDM (please dispense One Touch Verio brand)  Isma Good MD   Insulin Pen Needle (B-D ULTRAFINE III SHORT PEN) 31G X 8 MM MISC 3 TIMES A DAY  Isma Good MD   Insulin Syringe-Needle U-100 (INSULIN SYRINGE .3CC/31GX5/16\") 31G X 5/16\" 0.3 ML MISC Use 3 daily with insulin  Isma Good MD   NIFEdipine (PROCARDIA XL) 90 MG extended release tablet Take 90 mg by mouth  Historical Provider, MD   Dulaglutide (TRULICITY) 7.67 ZU/1.2QT SOPN Inject 0.75 mg into the skin once a week  Isma Good MD   Insulin Degludec (TRESIBA FLEXTOUCH) 200 UNIT/ML SOPN  units at bedtime  Isma Good MD   fluticasone (FLONASE) 50 MCG/ACT nasal spray 1 spray by Nasal route daily  Historical Provider, MD   senna (SENOKOT) 8.6 MG tablet Take 1 tablet by mouth 3 times daily  Historical Provider, MD   tamsulosin (FLOMAX) 0.4 MG capsule Take 0.4 mg by mouth daily. Historical Provider, MD   docusate sodium (COLACE) 100 MG capsule Take 100 mg by mouth 2 times daily. Historical Provider, MD   vitamin D (ERGOCALCIFEROL) 90043 UNITS CAPS capsule Take 50,000 Units by mouth once a week. Historical Provider, MD   metoprolol (TOPROL-XL) 25 MG XL tablet Take 25 mg by mouth daily. Historical Provider, MD   ACCU-CHEK FASTCLIX LANCETS MISC Pt test 4x daily  Kodi Obando MD   lisinopril (PRINIVIL;ZESTRIL) 20 MG tablet Take 1 tablet by mouth daily. Kodi Obando MD   sildenafil (VIAGRA) 50 MG tablet Take 50 mg by mouth as needed.     Historical Provider, MD       Social History     Tobacco Use    Smoking status: Former Smoker     Types: Cigarettes     Last attempt to quit: 1/1/1975     Years since quittin.5    Smokeless tobacco: Never Used   Substance Use Topics    Alcohol use: Not on file    Drug use: Not on file            PHYSICAL EXAMINATION:  [ INSTRUCTIONS:  \"[x]\" Indicates a positive item  \"[]\" Indicates a negative item  -- DELETE ALL ITEMS NOT EXAMINED]  [] Alert  [] Oriented to person/place/time    [] No apparent distress  [] Toxic appearing    [] Face flushed appearing [] Sclera clear  [] Lips are cyanotic      [] Breathing appears normal  [] Appears tachypneic      [] Rash on visible skin    [] Cranial Nerves II-XII grossly intact    [] Motor grossly intact in visible upper extremities    [] Motor grossly intact in visible lower extremities    [] Normal Mood  [] Anxious appearing    [] Depressed appearing  [] Confused appearing      [] Poor short term memory  [] Poor long term memory    [] OTHER:      Due to this being a TeleHealth encounter, evaluation of the following organ systems is limited: Vitals/Constitutional/EENT/Resp/CV/GI//MS/Neuro/Skin/Heme-Lymph-Imm. ASSESSMENT/PLAN:     Diagnosis Orders   1. Type 2 diabetes mellitus with other specified complication, with long-term current use of insulin (HCC)       Patient's leg was examined did not appear to be infected    Continue current dose of [place] and Novolin R Trulicity at current dosages patient to follow-up with primary care also advised leg elevation cover the area over his right leg  Total time spent with patient was 17 minutes        An  electronic signature was used to authenticate this note. --Clara Franco MD on 2020 at 3:30 PM        Pursuant to the emergency declaration under the [facility] waiver authority and the [facility] and [employer] Act, this Virtual  Visit was conducted, with patient's consent, to reduce the patient's risk of exposure to COVID-19 and provide continuity of care for an established patient.     Services

## 2020-08-04 RX ORDER — INSULIN DEGLUDEC 200 U/ML
INJECTION, SOLUTION SUBCUTANEOUS
Qty: 30 PEN | Refills: 3 | Status: SHIPPED | OUTPATIENT
Start: 2020-08-04 | End: 2021-04-06

## 2020-09-22 RX ORDER — HYDROCHLOROTHIAZIDE 25 MG/1
TABLET ORAL
Qty: 180 TABLET | Refills: 1 | Status: ON HOLD | OUTPATIENT
Start: 2020-09-22 | End: 2020-10-18 | Stop reason: ALTCHOICE

## 2020-10-17 ENCOUNTER — HOSPITAL ENCOUNTER (OUTPATIENT)
Age: 65
Setting detail: OBSERVATION
Discharge: HOME | End: 2020-10-18
Attending: INTERNAL MEDICINE | Admitting: INTERNAL MEDICINE
Payer: MEDICARE

## 2020-10-17 ENCOUNTER — APPOINTMENT (OUTPATIENT)
Dept: CT IMAGING | Age: 65
End: 2020-10-17
Payer: MEDICARE

## 2020-10-17 ENCOUNTER — APPOINTMENT (OUTPATIENT)
Dept: GENERAL RADIOLOGY | Age: 65
End: 2020-10-17
Payer: MEDICARE

## 2020-10-17 PROBLEM — I42.2 HYPERTROPHIC NONOBSTRUCTIVE CARDIOMYOPATHY (HCC): Status: ACTIVE | Noted: 2017-04-05

## 2020-10-17 PROBLEM — R06.09 DOE (DYSPNEA ON EXERTION): Status: ACTIVE | Noted: 2019-02-11

## 2020-10-17 PROBLEM — I11.9 CARDIOMYOPATHY DUE TO HYPERTENSION, WITHOUT HEART FAILURE (HCC): Status: ACTIVE | Noted: 2019-02-11

## 2020-10-17 PROBLEM — I43 CARDIOMYOPATHY DUE TO HYPERTENSION, WITHOUT HEART FAILURE (HCC): Status: ACTIVE | Noted: 2019-02-11

## 2020-10-17 PROBLEM — R55 SYNCOPE AND COLLAPSE: Status: ACTIVE | Noted: 2020-10-17

## 2020-10-17 LAB
ALBUMIN SERPL-MCNC: 4.2 G/DL (ref 3.5–4.6)
ALP BLD-CCNC: 87 U/L (ref 35–104)
ALT SERPL-CCNC: 17 U/L (ref 0–41)
AMPHETAMINE SCREEN, URINE: NORMAL
ANION GAP SERPL CALCULATED.3IONS-SCNC: 13 MEQ/L (ref 9–15)
AST SERPL-CCNC: 29 U/L (ref 0–40)
BACTERIA: NEGATIVE /HPF
BARBITURATE SCREEN URINE: NORMAL
BASOPHILS ABSOLUTE: 0.1 K/UL (ref 0–0.2)
BASOPHILS RELATIVE PERCENT: 1 %
BENZODIAZEPINE SCREEN, URINE: NORMAL
BILIRUB SERPL-MCNC: <0.2 MG/DL (ref 0.2–0.7)
BILIRUBIN URINE: NEGATIVE
BLOOD, URINE: ABNORMAL
BUN BLDV-MCNC: 33 MG/DL (ref 8–23)
CALCIUM SERPL-MCNC: 10.9 MG/DL (ref 8.5–9.9)
CANNABINOID SCREEN URINE: NORMAL
CHLORIDE BLD-SCNC: 101 MEQ/L (ref 95–107)
CHP ED QC CHECK: NORMAL
CK MB: 5.6 NG/ML (ref 0–6.7)
CK MB: 5.8 NG/ML (ref 0–6.7)
CLARITY: CLEAR
CO2: 22 MEQ/L (ref 20–31)
COCAINE METABOLITE SCREEN URINE: NORMAL
COLOR: YELLOW
CREAT SERPL-MCNC: 1.17 MG/DL (ref 0.7–1.2)
CREATINE KINASE-MB INDEX: 2.2 % (ref 0–3.5)
CREATINE KINASE-MB INDEX: 2.7 % (ref 0–3.5)
EOSINOPHILS ABSOLUTE: 0.3 K/UL (ref 0–0.7)
EOSINOPHILS RELATIVE PERCENT: 3.6 %
EPITHELIAL CELLS, UA: ABNORMAL /HPF (ref 0–5)
ETHANOL PERCENT: NORMAL G/DL
ETHANOL: <10 MG/DL (ref 0–0.08)
GFR AFRICAN AMERICAN: >60
GFR NON-AFRICAN AMERICAN: >60
GLOBULIN: 3.4 G/DL (ref 2.3–3.5)
GLUCOSE BLD-MCNC: 174 MG/DL
GLUCOSE BLD-MCNC: 174 MG/DL (ref 70–99)
GLUCOSE URINE: NEGATIVE MG/DL
HCT VFR BLD CALC: 36.6 % (ref 42–52)
HEMOGLOBIN: 12.5 G/DL (ref 14–18)
HYALINE CASTS: ABNORMAL /HPF (ref 0–5)
INR BLD: 1.1
KETONES, URINE: NEGATIVE MG/DL
LEUKOCYTE ESTERASE, URINE: NEGATIVE
LYMPHOCYTES ABSOLUTE: 2 K/UL (ref 1–4.8)
LYMPHOCYTES RELATIVE PERCENT: 24.8 %
Lab: NORMAL
MAGNESIUM: 1.8 MG/DL (ref 1.7–2.4)
MCH RBC QN AUTO: 30.7 PG (ref 27–31.3)
MCHC RBC AUTO-ENTMCNC: 34.1 % (ref 33–37)
MCV RBC AUTO: 89.9 FL (ref 80–100)
METHADONE SCREEN, URINE: NORMAL
MONOCYTES ABSOLUTE: 0.8 K/UL (ref 0.2–0.8)
MONOCYTES RELATIVE PERCENT: 9.9 %
NEUTROPHILS ABSOLUTE: 4.9 K/UL (ref 1.4–6.5)
NEUTROPHILS RELATIVE PERCENT: 60.7 %
NITRITE, URINE: NEGATIVE
OPIATE SCREEN URINE: NORMAL
OXYCODONE URINE: NORMAL
PDW BLD-RTO: 14.2 % (ref 11.5–14.5)
PH UA: 7 (ref 5–9)
PHENCYCLIDINE SCREEN URINE: NORMAL
PLATELET # BLD: 227 K/UL (ref 130–400)
POTASSIUM SERPL-SCNC: 5.5 MEQ/L (ref 3.4–4.9)
PRO-BNP: 253 PG/ML
PROPOXYPHENE SCREEN: NORMAL
PROTEIN UA: NEGATIVE MG/DL
PROTHROMBIN TIME: 13.9 SEC (ref 12.3–14.9)
RBC # BLD: 4.07 M/UL (ref 4.7–6.1)
RBC UA: ABNORMAL /HPF (ref 0–5)
REASON FOR REJECTION: NORMAL
REJECTED TEST: NORMAL
SODIUM BLD-SCNC: 136 MEQ/L (ref 135–144)
SPECIFIC GRAVITY UA: 1.01 (ref 1–1.03)
TOTAL CK: 212 U/L (ref 0–190)
TOTAL CK: 256 U/L (ref 0–190)
TOTAL PROTEIN: 7.6 G/DL (ref 6.3–8)
TROPONIN: 0.01 NG/ML (ref 0–0.01)
TROPONIN: 0.01 NG/ML (ref 0–0.01)
URINE REFLEX TO CULTURE: ABNORMAL
UROBILINOGEN, URINE: 0.2 E.U./DL
WBC # BLD: 8.1 K/UL (ref 4.8–10.8)
WBC UA: ABNORMAL /HPF (ref 0–5)

## 2020-10-17 PROCEDURE — 80053 COMPREHEN METABOLIC PANEL: CPT

## 2020-10-17 PROCEDURE — 6830039000 HC L3 TRAUMA ALERT

## 2020-10-17 PROCEDURE — 84484 ASSAY OF TROPONIN QUANT: CPT

## 2020-10-17 PROCEDURE — 80307 DRUG TEST PRSMV CHEM ANLYZR: CPT

## 2020-10-17 PROCEDURE — 93005 ELECTROCARDIOGRAM TRACING: CPT | Performed by: NURSE PRACTITIONER

## 2020-10-17 PROCEDURE — 82553 CREATINE MB FRACTION: CPT

## 2020-10-17 PROCEDURE — 72128 CT CHEST SPINE W/O DYE: CPT

## 2020-10-17 PROCEDURE — 70450 CT HEAD/BRAIN W/O DYE: CPT

## 2020-10-17 PROCEDURE — 96374 THER/PROPH/DIAG INJ IV PUSH: CPT

## 2020-10-17 PROCEDURE — 83880 ASSAY OF NATRIURETIC PEPTIDE: CPT

## 2020-10-17 PROCEDURE — 2500000003 HC RX 250 WO HCPCS: Performed by: NURSE PRACTITIONER

## 2020-10-17 PROCEDURE — 72125 CT NECK SPINE W/O DYE: CPT

## 2020-10-17 PROCEDURE — 6360000004 HC RX CONTRAST MEDICATION: Performed by: NURSE PRACTITIONER

## 2020-10-17 PROCEDURE — G0480 DRUG TEST DEF 1-7 CLASSES: HCPCS

## 2020-10-17 PROCEDURE — 99285 EMERGENCY DEPT VISIT HI MDM: CPT

## 2020-10-17 PROCEDURE — 73030 X-RAY EXAM OF SHOULDER: CPT

## 2020-10-17 PROCEDURE — 85610 PROTHROMBIN TIME: CPT

## 2020-10-17 PROCEDURE — 6370000000 HC RX 637 (ALT 250 FOR IP): Performed by: NURSE PRACTITIONER

## 2020-10-17 PROCEDURE — 81001 URINALYSIS AUTO W/SCOPE: CPT

## 2020-10-17 PROCEDURE — 85025 COMPLETE CBC W/AUTO DIFF WBC: CPT

## 2020-10-17 PROCEDURE — 2580000003 HC RX 258: Performed by: NURSE PRACTITIONER

## 2020-10-17 PROCEDURE — 36415 COLL VENOUS BLD VENIPUNCTURE: CPT

## 2020-10-17 PROCEDURE — 82550 ASSAY OF CK (CPK): CPT

## 2020-10-17 PROCEDURE — 83735 ASSAY OF MAGNESIUM: CPT

## 2020-10-17 PROCEDURE — 71275 CT ANGIOGRAPHY CHEST: CPT

## 2020-10-17 PROCEDURE — U0002 COVID-19 LAB TEST NON-CDC: HCPCS

## 2020-10-17 RX ORDER — 0.9 % SODIUM CHLORIDE 0.9 %
500 INTRAVENOUS SOLUTION INTRAVENOUS ONCE
Status: DISCONTINUED | OUTPATIENT
Start: 2020-10-17 | End: 2020-10-17

## 2020-10-17 RX ORDER — LIDOCAINE 4 G/G
1 PATCH TOPICAL ONCE
Status: COMPLETED | OUTPATIENT
Start: 2020-10-17 | End: 2020-10-18

## 2020-10-17 RX ORDER — 0.9 % SODIUM CHLORIDE 0.9 %
1000 INTRAVENOUS SOLUTION INTRAVENOUS ONCE
Status: COMPLETED | OUTPATIENT
Start: 2020-10-17 | End: 2020-10-17

## 2020-10-17 RX ORDER — LABETALOL HYDROCHLORIDE 5 MG/ML
20 INJECTION, SOLUTION INTRAVENOUS ONCE
Status: COMPLETED | OUTPATIENT
Start: 2020-10-17 | End: 2020-10-17

## 2020-10-17 RX ADMIN — LABETALOL HYDROCHLORIDE 20 MG: 5 INJECTION, SOLUTION INTRAVENOUS at 20:33

## 2020-10-17 RX ADMIN — IOPAMIDOL 100 ML: 612 INJECTION, SOLUTION INTRAVENOUS at 19:53

## 2020-10-17 RX ADMIN — SODIUM CHLORIDE 1000 ML: 9 INJECTION, SOLUTION INTRAVENOUS at 20:32

## 2020-10-17 ASSESSMENT — ENCOUNTER SYMPTOMS
COUGH: 0
SINUS PRESSURE: 0
CONSTIPATION: 0
SINUS PAIN: 0
SORE THROAT: 0
EYE REDNESS: 0
VOMITING: 0
EYE PAIN: 0
SHORTNESS OF BREATH: 1
DIARRHEA: 0
COLOR CHANGE: 0
BACK PAIN: 1
CHEST TIGHTNESS: 0
ABDOMINAL DISTENTION: 0
WHEEZING: 0
PHOTOPHOBIA: 0
EYE DISCHARGE: 0
NAUSEA: 0
EYE ITCHING: 0
ABDOMINAL PAIN: 0
RHINORRHEA: 0

## 2020-10-17 ASSESSMENT — PAIN DESCRIPTION - PAIN TYPE: TYPE: ACUTE PAIN

## 2020-10-17 ASSESSMENT — PAIN SCALES - GENERAL: PAINLEVEL_OUTOF10: 5

## 2020-10-17 ASSESSMENT — PAIN DESCRIPTION - LOCATION: LOCATION: CHEST;BACK

## 2020-10-17 NOTE — ED PROVIDER NOTES
[address] [facility] ED  EMERGENCY DEPARTMENT ENCOUNTER      Pt Name: Lucho Cruz  MRN: [id number]  Tyresegfdeandra 1955  Date of evaluation: 10/17/2020  Provider: ANDREW Gilliland CNP    CHIEF COMPLAINT       Chief Complaint   Patient presents with    Other     pt had a synci=o[ple episode  hit forehead on edge og hot tub   no visibkle trauma noted     Category 2 trauma syncopal episode fall with head and back injury age greater than 72    HISTORY OF PRESENT ILLNESS   (Location/Symptom, Timing/Onset,Context/Setting, Quality, Duration, Modifying Factors, Severity)  Note limiting factors. Lucho Cruz is a 75 y.o. male who presents to the emergency department for complaint of brief syncopal episode with head and back injury. Per EMS patient's wife called after she heard a loud thump and discovered that her  had fallen and appeared to be unconscious for less than 2 minutes. Per the wife report the patient was a bit dazed confused and \"glossy\". EMS states that on their arrival he was alert and oriented x4 able to explain the events just before the fall. Patient states that he was walking towards his hot tub to do some cleaning when he became suddenly very weak and felt his legs gave out on him. He states that he started to fall forward striking his head on the edge of the hot tub and then bounced backward falling onto his mid back. He states that this time he feels back to his normal there is no dizziness but he states he has a pain in the front of his forehead where he struck his head as well as a pain across his mid back between his scapula. He states he was on his back when he woke up and believes that he fell onto something but the balance and off of the hot tub. States that his pain 4 out of 10 in the head and mid back achy throbbing sensation made worse with pressure to the area.   Additionally states that he has some stiffness sensation in his left shoulder and a mild after fall) and headaches (forehead from hitting head on hot tub). Negative for dizziness, tremors, seizures, speech difficulty, weakness and light-headedness. Except as noted above the remainder of the review of systems was reviewed and negative. PAST MEDICAL HISTORY     Past Medical History:   Diagnosis Date    Hypertension     Hypogonadism male     Type II or unspecified type diabetes mellitus without mention of complication, not stated as uncontrolled      No past surgical history on file.   Social History     Socioeconomic History    Marital status:      Spouse name: Not on file    Number of children: Not on file    Years of education: Not on file    Highest education level: Not on file   Occupational History    Not on file   Social Needs    Financial resource strain: Not on file    Food insecurity     Worry: Not on file     Inability: Not on file    Transportation needs     Medical: Not on file     Non-medical: Not on file   Tobacco Use    Smoking status: Former Smoker     Types: Cigarettes     Last attempt to quit: 1975     Years since quittin.8    Smokeless tobacco: Never Used   Substance and Sexual Activity    Alcohol use: Not on file    Drug use: Not on file    Sexual activity: Not on file   Lifestyle    Physical activity     Days per week: Not on file     Minutes per session: Not on file    Stress: Not on file   Relationships    Social connections     Talks on phone: Not on file     Gets together: Not on file     Attends Rastafarian service: Not on file     Active member of club or organization: Not on file     Attends meetings of clubs or organizations: Not on file     Relationship status: Not on file    Intimate partner violence     Fear of current or ex partner: Not on file     Emotionally abused: Not on file     Physically abused: Not on file     Forced sexual activity: Not on file   Other Topics Concern    Not on file   Social History Narrative    Not on file       SCREENINGS   NIH Stroke Scale  NIH Stroke Scale Assessed: Yes  Interval: Baseline  Level of Consciousness (1a. ): Alert  LOC Questions (1b. ): Answers both correctly  LOC Commands (1c. ): Performs both tasks correctly  Best Gaze (2. ): Normal  Visual (3. ): No visual loss  Facial Palsy (4. ): Normal symmetrical movement  Motor Arm, Left (5a. ): No drift  Motor Arm, Right (5b. ): No drift  Motor Leg, Left (6a. ): No drift  Motor Leg, Right (6b. ): No drift  Limb Ataxia (7. ): Absent  Sensory (8. ): Normal  Best Language (9. ): No aphasia  Dysarthria (10. ): Normal  Extinction and Inattention (11): No abnormality  Total: 0Glasgow Coma Scale  Eye Opening: Spontaneous  Best Verbal Response: Oriented  Best Motor Response: Obeys commands  Junior Coma Scale Score: 15        PHYSICAL EXAM    (up to 7 for level 4, 8 or more for level 5)     ED Triage Vitals [10/17/20 1838]   BP Temp Temp src Pulse Resp SpO2 Height Weight   (!) 184/93 97.9 °F (36.6 °C) -- 90 14 99 % -- --       Physical Exam  Constitutional:       General: He is not in acute distress. Appearance: Normal appearance. He is obese. He is not ill-appearing, toxic-appearing or diaphoretic. HENT:      Head: Normocephalic. Contusion present. No raccoon eyes, Estevez's sign, abrasion, masses or laceration. Jaw: There is normal jaw occlusion. No tenderness. Right Ear: Hearing and external ear normal.      Left Ear: Hearing and external ear normal.      Nose: Nose normal.      Mouth/Throat:      Lips: Pink. Mouth: Mucous membranes are moist.      Tongue: No lesions. Tongue does not deviate from midline. Pharynx: No pharyngeal swelling, oropharyngeal exudate or posterior oropharyngeal erythema. Eyes:      General:         Right eye: No discharge. Left eye: No discharge. Extraocular Movements: Extraocular movements intact.       Conjunctiva/sclera: Conjunctivae normal.      Pupils: Pupils are equal, round, and reactive to light. Neck:      Musculoskeletal: Normal range of motion and neck supple. No neck rigidity or muscular tenderness. Cardiovascular:      Rate and Rhythm: Normal rate and regular rhythm. Pulses: Normal pulses. Pulmonary:      Effort: Pulmonary effort is normal.      Breath sounds: Normal breath sounds. Chest:      Chest wall: No tenderness. Abdominal:      General: Bowel sounds are normal. There is no distension. Palpations: Abdomen is soft. Tenderness: There is no abdominal tenderness. Musculoskeletal: Normal range of motion. General: Tenderness and signs of injury present. No swelling or deformity. Right shoulder: Normal.      Left shoulder: He exhibits tenderness and pain. He exhibits normal range of motion, no bony tenderness, no swelling, no effusion, no crepitus, no deformity, no laceration, no spasm, normal pulse and normal strength. Right elbow: Normal.     Left elbow: Normal.      Right wrist: Normal.      Left wrist: Normal.      Right knee: Normal.      Left knee: Normal.      Right ankle: He exhibits swelling. He exhibits normal range of motion and normal pulse. No tenderness. Left ankle: He exhibits swelling. He exhibits normal range of motion and normal pulse. No tenderness. Cervical back: Normal.      Thoracic back: He exhibits tenderness and pain. He exhibits normal range of motion, no bony tenderness, no swelling, no edema, no deformity, no laceration, no spasm and normal pulse. Lumbar back: Normal.        Back:         Arms:       Right hand: Normal.      Left hand: Normal.      Right lower leg: Edema present. Left lower leg: Edema present. Right foot: Normal range of motion and normal capillary refill. Swelling present. No tenderness, bony tenderness, crepitus or deformity. Left foot: Normal range of motion and normal capillary refill. Swelling present. No tenderness, bony tenderness, crepitus or deformity.       Comments: +3 pitting edema from mid calf to toes bilaterally equal on both sides   Skin:     General: Skin is warm and dry. Capillary Refill: Capillary refill takes less than 2 seconds. Neurological:      General: No focal deficit present. Mental Status: He is alert and oriented to person, place, and time. Mental status is at baseline. Cranial Nerves: No cranial nerve deficit. Sensory: No sensory deficit. Motor: No weakness. Coordination: Coordination normal.         RESULTS     EKG: All EKG's are interpreted by the Emergency Department Physician who either signs or Co-signsthis chart in the absence of a cardiologist.    Sinus rhythm first-degree AV block noted right bundle branch block left anterior fascicular block, bifascicular block, 96 bpm no apparent acute ST elevation or deviation no ectopy QTc 505 ms. Comparison to ECG [facility] at [facility] August 6, 2020 shows no acute changes bifascicular block present at the time. Repeat EKG shows sinus rhythm first-degree block 66 bpm again noted bifascicular block right bundle branch block left anterior fascicular block no apparent acute ST elevation or deviation no ectopy QTc 440 ms no acute change    RADIOLOGY:   Non-plain filmimages such as CT, Ultrasound and MRI are read by the radiologist. Plain radiographic images are visualized and preliminarily interpreted by the emergency physician with the below findings:    CT of the head present radiology shows no acute intracranial abnormality identified. Mild left frontal soft tissue swelling no acute fracture. Fluid within sclerotic left mastoid air cells. Mildly coastal thickening the macular sinus and sphenoid sinuses. Moderate mucosal thickening in the ethmoid air cells. CT of the cervical spine no acute fracture abnormality identified. Fluid with thin sclerotic left mastoid air cells. Mild mucosal thickening in the sphenoid sinuses. Heterogeneous thyroid incidental finding. Ossification of the nuchal ligament is likely related to remote trauma. CTA of the chest per stat radiology shows no acute traumatic abnormality identified in the chest.  No acute pulmonary parenchymal abnormality identified. Incidental finding of heterogeneous thyroid as well as nonspecific mildly prominent mediastinal hilar lymph nodes. CT of the thoracic spine shows no acute traumatic abnormality identified. Degenerative change of the spine. 5 view x-ray of the left shoulder shows no acute fracture displacement or bony process mild degenerative changes    Interpretation per the Radiologist below, if available at the time ofthis note:    XR SHOULDER LEFT (MIN 2 VIEWS)   Final Result   NO ACUTE FRACTURES      CT HEAD WO CONTRAST   Final Result      NO ACUTE INTRA-AXIAL OR EXTRA-AXIAL FINDINGS. Other findings detailed above      All CT scans at this facility use dose modulation, iterative reconstruction, and/or weight based dosing when appropriate to reduce radiation dose to as low as reasonably achievable. CT CERVICAL SPINE WO CONTRAST, CT HEAD WO CONTRAST      CLINICAL HISTORY:  sudden syncopal episode approximately 30 minutes prior to arrival less than 2 minutes loss of consciousness, struck forehead falling forward, twisted fell and struck mid back, pain mid back and forehead brief SOB after fall       COMPARISON: NONE      Findings:      Multiple serial axial images of the cervical spine from the base of the skull through the upper thoracic vertebra with both sagittal and coronal reconstructions was performed. There is straightening of the normal expected cervical lordosis. There is multilevel degenerative joint disease. Prevertebral  soft tissues are  unremarkable. The disk spaces are intact   No acute fractures or spondylo-listhesis. There is a 1.3 cm nodule within the right thyroid. 1 cm nodule left thyroid lobe   . IMPRESSION:      NO ACUTE FRACTURES.       NODULES IN BOTH LEFT AND RIGHT THYROID LOBES. CORRELATE CLINICALLY AND FOLLOW-UP AND FURTHER EVALUATION WARRANTED      Within both lobes of thyroid. Correlate clinically follow-up and further evaluation warranted      All CT scans at this facility use dose modulation, iterative reconstruction, and/or weight based dosing when appropriate to reduce radiation dose to as low as reasonably achievable. CT CERVICAL SPINE WO CONTRAST   Final Result      NO ACUTE INTRA-AXIAL OR EXTRA-AXIAL FINDINGS. Other findings detailed above      All CT scans at this facility use dose modulation, iterative reconstruction, and/or weight based dosing when appropriate to reduce radiation dose to as low as reasonably achievable. CT CERVICAL SPINE WO CONTRAST, CT HEAD WO CONTRAST      CLINICAL HISTORY:  sudden syncopal episode approximately 30 minutes prior to arrival less than 2 minutes loss of consciousness, struck forehead falling forward, twisted fell and struck mid back, pain mid back and forehead brief SOB after fall       COMPARISON: NONE      Findings:      Multiple serial axial images of the cervical spine from the base of the skull through the upper thoracic vertebra with both sagittal and coronal reconstructions was performed. There is straightening of the normal expected cervical lordosis. There is multilevel degenerative joint disease. Prevertebral  soft tissues are  unremarkable. The disk spaces are intact   No acute fractures or spondylo-listhesis. There is a 1.3 cm nodule within the right thyroid. 1 cm nodule left thyroid lobe   . IMPRESSION:      NO ACUTE FRACTURES. NODULES IN BOTH LEFT AND RIGHT THYROID LOBES. CORRELATE CLINICALLY AND FOLLOW-UP AND FURTHER EVALUATION WARRANTED      Within both lobes of thyroid.  Correlate clinically follow-up and further evaluation warranted      All CT scans at this facility use dose modulation, iterative reconstruction, and/or weight based dosing when appropriate to reduce radiation dose to as low as reasonably achievable. CT THORACIC SPINE WO CONTRAST    (Results Pending)   CTA CHEST W WO CONTRAST    (Results Pending)         ED BEDSIDE ULTRASOUND:   Performed by ED Physician - none    LABS:  Labs Reviewed   CBC WITH AUTO DIFFERENTIAL - Abnormal; Notable for the following components:       Result Value    RBC 4.07 (*)     Hemoglobin 12.5 (*)     Hematocrit 36.6 (*)     All other components within normal limits   COMPREHENSIVE METABOLIC PANEL - Abnormal; Notable for the following components:    Potassium 5.5 (*)     Glucose 174 (*)     BUN 33 (*)     Calcium 10.9 (*)     All other components within normal limits   URINE RT REFLEX TO CULTURE - Abnormal; Notable for the following components:    Blood, Urine TRACE (*)     All other components within normal limits   CK - Abnormal; Notable for the following components: Total  (*)     All other components within normal limits   TROPONIN - Abnormal; Notable for the following components:    Troponin 0.013 (*)     All other components within normal limits    Narrative:     Allyson Carlos tel. [phone],  TROP results called to and read back by BEHAVIORAL MEDICINE AT [facility], 10/17/2020 19:43, by  Franc Carias - Abnormal; Notable for the following components:    RBC, UA 3-5 (*)     All other components within normal limits   CK - Abnormal; Notable for the following components:     Total  (*)     All other components within normal limits   TROPONIN - Abnormal; Notable for the following components:    Troponin 0.014 (*)     All other components within normal limits    Narrative:     Allyson Carlos tel. [phone],  TROP results called to and read back by BEHAVIORAL MEDICINE AT [facility], 10/17/2020 23:01, by  [facility]   POCT GLUCOSE - Normal   ETHANOL   [facility]    Narrative:     Allyson Carlos tel. [phone],  TROP results called to and read back by BEHAVIORAL MEDICINE AT [facility], 10/17/2020 19:43, by  [address]    Narrative:     REDRAW   [facility]    Narrative:     Roni Bethea tel. [phone] results called to and read back by BEHAVIORAL MEDICINE AT [facility], 10/17/2020 23:01, by  Vaughn Mendieta   COVID-19   COVID-19   COVID-19       All other labs were within normal range or not returned as of this dictation. EMERGENCY DEPARTMENT COURSE and DIFFERENTIAL DIAGNOSIS/MDM:   Vitals:    Vitals:    10/17/20 2030 10/17/20 2039 10/17/20 2153 10/17/20 2300   BP: (!) 177/67 135/76  (!) 159/77   Pulse: 91 73  75   Resp: 8 16 18 18   Temp:       TempSrc:       SpO2: 98% 99% 100% 99%     Patient reported that he had evaluation for a AAA as well as an echocardiogram last month the beginning of September this is reflected in the care everywhere the results are normal additionally patient had ultrasound of the bilateral lower extremities in July with no evidence of DVTs. MDM patient presents afebrile nontoxic no acute distress noted to have significant elevation of blood pressure on arrival.  The blood pressure responded well to a dose of labetalol. Patient complaint of pain to the forehead and mid back. CT scan showed no acute fracture no traumatic injuries. Patient does have a noted history of cardiomyopathy and does follow-up frequently with his primary care provider and cardiologist through the [facility] clinic. EKG shows bifascicular block on review of the chart patient's previous EKG showed this this is not an acute change. There is significant concern for the increased bilateral pitting edema of the lower extremities as well as patient's complaint of recent generalized weakness easily fatigued. Patient patient does have a slight elevation to potassium and calcium however these are noted and reflected in previous labs and do not appear to be acute process. Patient's orthostatics were stable.   Patient was able to ambulate a short distance from his room to the bathroom back without discomfort. Concern for the episode of syncope and collapse with a history of cardiomyopathy for potential underlying arrhythmia versus cardiac damage. There is a slight elevation to troponin at 0.013 and on the repeat of this there is again another slight elevation of 0.014. Due to the patient's syncope and collapse history of cardiomyopathy and slight elevation of troponin decision is made to admit patient for further treatment evaluation. Patient was initially hesitant to be admitted stating that he would like to go home and not would be admitted. However during the discussion of this the patient states he does have some slight pressure sensation on his chest that he did not mention earlier and states that this is intermittent and not overwhelmingly painful. I spoke the patient of the risks to his health and safety and he agrees to be admitted for further treatment evaluation due to significant concern. Hospitalist has accept admissions patient for further treatment evaluation of the syncopal episode    CRITICAL CARE TIME       CONSULTS:  None    PROCEDURES:  Unless otherwise noted below, none     Procedures    FINAL IMPRESSION      1. Syncope and collapse    2. Elevated troponin    3. Peripheral edema    4. Closed head injury, initial encounter    5. Contusion of middle back wall of thorax, initial encounter          DISPOSITION/PLAN   DISPOSITION        PATIENT REFERRED TO:  No follow-up provider specified.     DISCHARGE MEDICATIONS:  New Prescriptions    No medications on file          (Please notethat portions of this note were completed with a voice recognition program.  Efforts were made to edit the dictations but occasionally words are mis-transcribed.)    ANDREW Child CNP (electronically signed)  Attending Emergency Physician         ANDREW Child CNP  10/17/20 [id number]

## 2020-10-18 VITALS
BODY MASS INDEX: 44.1 KG/M2 | HEIGHT: 71 IN | OXYGEN SATURATION: 99 % | SYSTOLIC BLOOD PRESSURE: 159 MMHG | TEMPERATURE: 97.5 F | RESPIRATION RATE: 18 BRPM | HEART RATE: 59 BPM | DIASTOLIC BLOOD PRESSURE: 77 MMHG | WEIGHT: 315 LBS

## 2020-10-18 PROBLEM — R79.89 ELEVATED TROPONIN: Status: ACTIVE | Noted: 2020-10-18

## 2020-10-18 PROBLEM — R25.1 TREMOR: Status: ACTIVE | Noted: 2020-10-18

## 2020-10-18 PROBLEM — R94.31 ABNORMAL EKG: Status: ACTIVE | Noted: 2020-10-18

## 2020-10-18 PROBLEM — R77.8 ELEVATED TROPONIN: Status: ACTIVE | Noted: 2020-10-18

## 2020-10-18 PROBLEM — E83.52 HYPERCALCEMIA: Status: ACTIVE | Noted: 2020-10-18

## 2020-10-18 LAB
ALBUMIN SERPL-MCNC: 3.5 G/DL (ref 3.5–4.6)
ALP BLD-CCNC: 65 U/L (ref 35–104)
ALT SERPL-CCNC: 14 U/L (ref 0–41)
ANION GAP SERPL CALCULATED.3IONS-SCNC: 13 MEQ/L (ref 9–15)
AST SERPL-CCNC: 13 U/L (ref 0–40)
BILIRUB SERPL-MCNC: <0.2 MG/DL (ref 0.2–0.7)
BUN BLDV-MCNC: 26 MG/DL (ref 8–23)
CALCIUM SERPL-MCNC: 10 MG/DL (ref 8.5–9.9)
CHLORIDE BLD-SCNC: 101 MEQ/L (ref 95–107)
CO2: 21 MEQ/L (ref 20–31)
CREAT SERPL-MCNC: 0.98 MG/DL (ref 0.7–1.2)
EKG ATRIAL RATE: 63 BPM
EKG ATRIAL RATE: 66 BPM
EKG ATRIAL RATE: 96 BPM
EKG P AXIS: 30 DEGREES
EKG P AXIS: 33 DEGREES
EKG P-R INTERVAL: 238 MS
EKG P-R INTERVAL: 246 MS
EKG P-R INTERVAL: 250 MS
EKG Q-T INTERVAL: 400 MS
EKG Q-T INTERVAL: 422 MS
EKG Q-T INTERVAL: 444 MS
EKG QRS DURATION: 136 MS
EKG QRS DURATION: 138 MS
EKG QRS DURATION: 144 MS
EKG QTC CALCULATION (BAZETT): 442 MS
EKG QTC CALCULATION (BAZETT): 454 MS
EKG QTC CALCULATION (BAZETT): 505 MS
EKG R AXIS: -55 DEGREES
EKG R AXIS: -61 DEGREES
EKG R AXIS: -67 DEGREES
EKG T AXIS: 54 DEGREES
EKG T AXIS: 72 DEGREES
EKG T AXIS: 97 DEGREES
EKG VENTRICULAR RATE: 63 BPM
EKG VENTRICULAR RATE: 66 BPM
EKG VENTRICULAR RATE: 96 BPM
GFR AFRICAN AMERICAN: >60
GFR NON-AFRICAN AMERICAN: >60
GLOBULIN: 3 G/DL (ref 2.3–3.5)
GLUCOSE BLD-MCNC: 155 MG/DL (ref 60–115)
GLUCOSE BLD-MCNC: 249 MG/DL (ref 60–115)
GLUCOSE BLD-MCNC: 264 MG/DL (ref 60–115)
GLUCOSE BLD-MCNC: 265 MG/DL (ref 70–99)
HBA1C MFR BLD: 7.5 % (ref 4.8–5.9)
PARATHYROID HORMONE INTACT: 67 PG/ML (ref 15–65)
PERFORMED ON: ABNORMAL
PHOSPHORUS: 2 MG/DL (ref 2.3–4.8)
POTASSIUM REFLEX MAGNESIUM: 4 MEQ/L (ref 3.4–4.9)
SARS-COV-2, NAAT: NOT DETECTED
SODIUM BLD-SCNC: 135 MEQ/L (ref 135–144)
T4 FREE: 1.04 NG/DL (ref 0.84–1.68)
TOTAL PROTEIN: 6.5 G/DL (ref 6.3–8)
TROPONIN: <0.01 NG/ML (ref 0–0.01)
TSH REFLEX: 2.81 UIU/ML (ref 0.44–3.86)
VITAMIN D 25-HYDROXY: 21.5 NG/ML (ref 30–100)
VITAMIN D 25-HYDROXY: 21.8 NG/ML (ref 30–100)

## 2020-10-18 PROCEDURE — 83970 ASSAY OF PARATHORMONE: CPT

## 2020-10-18 PROCEDURE — 82306 VITAMIN D 25 HYDROXY: CPT

## 2020-10-18 PROCEDURE — 36415 COLL VENOUS BLD VENIPUNCTURE: CPT

## 2020-10-18 PROCEDURE — 96372 THER/PROPH/DIAG INJ SC/IM: CPT

## 2020-10-18 PROCEDURE — 84100 ASSAY OF PHOSPHORUS: CPT

## 2020-10-18 PROCEDURE — 6370000000 HC RX 637 (ALT 250 FOR IP): Performed by: INTERNAL MEDICINE

## 2020-10-18 PROCEDURE — G0378 HOSPITAL OBSERVATION PER HR: HCPCS

## 2020-10-18 PROCEDURE — 82652 VIT D 1 25-DIHYDROXY: CPT

## 2020-10-18 PROCEDURE — 2580000003 HC RX 258: Performed by: NURSE PRACTITIONER

## 2020-10-18 PROCEDURE — 82542 COL CHROMOTOGRAPHY QUAL/QUAN: CPT

## 2020-10-18 PROCEDURE — 6370000000 HC RX 637 (ALT 250 FOR IP): Performed by: NURSE PRACTITIONER

## 2020-10-18 PROCEDURE — 6360000002 HC RX W HCPCS: Performed by: NURSE PRACTITIONER

## 2020-10-18 PROCEDURE — 83036 HEMOGLOBIN GLYCOSYLATED A1C: CPT

## 2020-10-18 PROCEDURE — 84484 ASSAY OF TROPONIN QUANT: CPT

## 2020-10-18 PROCEDURE — 80053 COMPREHEN METABOLIC PANEL: CPT

## 2020-10-18 PROCEDURE — 84439 ASSAY OF FREE THYROXINE: CPT

## 2020-10-18 PROCEDURE — 86376 MICROSOMAL ANTIBODY EACH: CPT

## 2020-10-18 PROCEDURE — 94660 CPAP INITIATION&MGMT: CPT

## 2020-10-18 PROCEDURE — 93005 ELECTROCARDIOGRAM TRACING: CPT | Performed by: NURSE PRACTITIONER

## 2020-10-18 PROCEDURE — 96375 TX/PRO/DX INJ NEW DRUG ADDON: CPT

## 2020-10-18 PROCEDURE — 84443 ASSAY THYROID STIM HORMONE: CPT

## 2020-10-18 RX ORDER — DEXTROSE MONOHYDRATE 50 MG/ML
100 INJECTION, SOLUTION INTRAVENOUS PRN
Status: DISCONTINUED | OUTPATIENT
Start: 2020-10-18 | End: 2020-10-18 | Stop reason: HOSPADM

## 2020-10-18 RX ORDER — NIFEDIPINE 30 MG/1
90 TABLET, EXTENDED RELEASE ORAL DAILY
Status: DISCONTINUED | OUTPATIENT
Start: 2020-10-18 | End: 2020-10-18 | Stop reason: HOSPADM

## 2020-10-18 RX ORDER — DEXTROSE MONOHYDRATE 25 G/50ML
12.5 INJECTION, SOLUTION INTRAVENOUS PRN
Status: DISCONTINUED | OUTPATIENT
Start: 2020-10-18 | End: 2020-10-18 | Stop reason: HOSPADM

## 2020-10-18 RX ORDER — KETOROLAC TROMETHAMINE 15 MG/ML
15 INJECTION, SOLUTION INTRAMUSCULAR; INTRAVENOUS EVERY 6 HOURS PRN
Status: DISCONTINUED | OUTPATIENT
Start: 2020-10-18 | End: 2020-10-18 | Stop reason: HOSPADM

## 2020-10-18 RX ORDER — SODIUM CHLORIDE 0.9 % (FLUSH) 0.9 %
10 SYRINGE (ML) INJECTION PRN
Status: DISCONTINUED | OUTPATIENT
Start: 2020-10-18 | End: 2020-10-18 | Stop reason: HOSPADM

## 2020-10-18 RX ORDER — ONDANSETRON 2 MG/ML
4 INJECTION INTRAMUSCULAR; INTRAVENOUS EVERY 6 HOURS PRN
Status: DISCONTINUED | OUTPATIENT
Start: 2020-10-18 | End: 2020-10-18 | Stop reason: HOSPADM

## 2020-10-18 RX ORDER — ACETAMINOPHEN 650 MG/1
650 SUPPOSITORY RECTAL EVERY 6 HOURS PRN
Status: DISCONTINUED | OUTPATIENT
Start: 2020-10-18 | End: 2020-10-18 | Stop reason: HOSPADM

## 2020-10-18 RX ORDER — LISINOPRIL 20 MG/1
20 TABLET ORAL DAILY
Status: DISCONTINUED | OUTPATIENT
Start: 2020-10-18 | End: 2020-10-18 | Stop reason: HOSPADM

## 2020-10-18 RX ORDER — NICOTINE POLACRILEX 4 MG
15 LOZENGE BUCCAL PRN
Status: DISCONTINUED | OUTPATIENT
Start: 2020-10-18 | End: 2020-10-18 | Stop reason: HOSPADM

## 2020-10-18 RX ORDER — LIDOCAINE 4 G/G
1 PATCH TOPICAL DAILY
Status: DISCONTINUED | OUTPATIENT
Start: 2020-10-18 | End: 2020-10-18 | Stop reason: HOSPADM

## 2020-10-18 RX ORDER — FUROSEMIDE 20 MG/1
20 TABLET ORAL 2 TIMES DAILY
COMMUNITY

## 2020-10-18 RX ORDER — PROMETHAZINE HYDROCHLORIDE 12.5 MG/1
12.5 TABLET ORAL EVERY 6 HOURS PRN
Status: DISCONTINUED | OUTPATIENT
Start: 2020-10-18 | End: 2020-10-18 | Stop reason: HOSPADM

## 2020-10-18 RX ORDER — SODIUM CHLORIDE 0.9 % (FLUSH) 0.9 %
10 SYRINGE (ML) INJECTION EVERY 12 HOURS SCHEDULED
Status: DISCONTINUED | OUTPATIENT
Start: 2020-10-18 | End: 2020-10-18 | Stop reason: HOSPADM

## 2020-10-18 RX ORDER — POLYETHYLENE GLYCOL 3350 17 G/17G
17 POWDER, FOR SOLUTION ORAL DAILY PRN
Status: DISCONTINUED | OUTPATIENT
Start: 2020-10-18 | End: 2020-10-18 | Stop reason: HOSPADM

## 2020-10-18 RX ORDER — INSULIN GLARGINE 100 [IU]/ML
30 INJECTION, SOLUTION SUBCUTANEOUS NIGHTLY
Status: DISCONTINUED | OUTPATIENT
Start: 2020-10-18 | End: 2020-10-18 | Stop reason: HOSPADM

## 2020-10-18 RX ORDER — GABAPENTIN 300 MG/1
300 CAPSULE ORAL 3 TIMES DAILY
Status: DISCONTINUED | OUTPATIENT
Start: 2020-10-18 | End: 2020-10-18 | Stop reason: HOSPADM

## 2020-10-18 RX ORDER — FUROSEMIDE 20 MG/1
20 TABLET ORAL DAILY
Status: DISCONTINUED | OUTPATIENT
Start: 2020-10-18 | End: 2020-10-18 | Stop reason: HOSPADM

## 2020-10-18 RX ORDER — TAMSULOSIN HYDROCHLORIDE 0.4 MG/1
0.4 CAPSULE ORAL DAILY
Status: DISCONTINUED | OUTPATIENT
Start: 2020-10-18 | End: 2020-10-18 | Stop reason: HOSPADM

## 2020-10-18 RX ORDER — ACETAMINOPHEN 325 MG/1
650 TABLET ORAL EVERY 6 HOURS PRN
Status: DISCONTINUED | OUTPATIENT
Start: 2020-10-18 | End: 2020-10-18 | Stop reason: HOSPADM

## 2020-10-18 RX ADMIN — ENOXAPARIN SODIUM 40 MG: 40 INJECTION SUBCUTANEOUS at 09:10

## 2020-10-18 RX ADMIN — INSULIN LISPRO 4 UNITS: 100 INJECTION, SOLUTION INTRAVENOUS; SUBCUTANEOUS at 09:13

## 2020-10-18 RX ADMIN — GABAPENTIN 300 MG: 300 CAPSULE ORAL at 14:23

## 2020-10-18 RX ADMIN — INSULIN LISPRO 6 UNITS: 100 INJECTION, SOLUTION INTRAVENOUS; SUBCUTANEOUS at 11:37

## 2020-10-18 RX ADMIN — LISINOPRIL 20 MG: 20 TABLET ORAL at 09:10

## 2020-10-18 RX ADMIN — Medication 10 ML: at 09:13

## 2020-10-18 RX ADMIN — GABAPENTIN 300 MG: 300 CAPSULE ORAL at 09:11

## 2020-10-18 RX ADMIN — TAMSULOSIN HYDROCHLORIDE 0.4 MG: 0.4 CAPSULE ORAL at 09:11

## 2020-10-18 RX ADMIN — FUROSEMIDE 20 MG: 20 TABLET ORAL at 09:11

## 2020-10-18 ASSESSMENT — PAIN SCALES - GENERAL: PAINLEVEL_OUTOF10: 1

## 2020-10-18 ASSESSMENT — ENCOUNTER SYMPTOMS
STRIDOR: 0
VOMITING: 0
EYE PAIN: 0
WHEEZING: 0
NAUSEA: 0
TROUBLE SWALLOWING: 0
SINUS PAIN: 0
CHOKING: 0
COUGH: 0
SORE THROAT: 0
EYE DISCHARGE: 0
ABDOMINAL PAIN: 0
SHORTNESS OF BREATH: 0
CHEST TIGHTNESS: 0
APNEA: 0
SINUS PRESSURE: 0
COLOR CHANGE: 0
ABDOMINAL DISTENTION: 0

## 2020-10-18 ASSESSMENT — PAIN DESCRIPTION - LOCATION: LOCATION: BACK

## 2020-10-18 NOTE — PROGRESS NOTES
Per protocol patient was put back into droplet plus precautions until he has a second covid test or is ruled out by ID.

## 2020-10-18 NOTE — FLOWSHEET NOTE
Patient has told me twice today that he is not staying another night here. Pt states that he doesn't like our c-pap machine. I instructd patient that he can call a family member and have them bring in his own c-pap machine. Pt states that he likes his own bed better. I explained to the patient that some of his lab work is abnormal and that the doctors are not ready to discharge him, due to this, with more lab tests to be done in the morning.

## 2020-10-18 NOTE — H&P
[facility]  MEDICINE    HISTORY AND PHYSICAL EXAM    PATIENT NAME:  Diana Velasco    MRN:  [id number]  SERVICE DATE:  10/18/2020   SERVICE TIME:  2:40 AM    Primary Care Physician: Cl Villalobos MD         SUBJECTIVE  CHIEF COMPLAINT:  syncope    HPI:  This is a 75 y.o. male w HTN, DM2, HLD, tremor and cardiomyopathy who presents with syncope. He was walking, \"felt my legs start to go, vision went black\"  Then remembers waking up w/ wife asking him if he was OK. His wife states he was unconscious for 1-2 minutes and was confused briefly when he regained consciousness. He struck his forehead and somehow rolled and struck the back of his head as well. He has no HA. He complained of blurred vision - \"a yellow banner at the top of  His visual field, but this has since resolved. He also complains of sore upper back and shoulders since fall. He denies dizziness, prior syncope, chest pain and SOB. No N/V, or diaphoresis. He is admitted for further eval and treatment of syncope w collapse. PAST MEDICAL HISTORY:    Past Medical History:   Diagnosis Date    Hypertension     Hypogonadism male     Type II or unspecified type diabetes mellitus without mention of complication, not stated as uncontrolled      PAST SURGICAL HISTORY:  No past surgical history on file. FAMILY HISTORY:  No family history on file.   SOCIAL HISTORY:    Social History     Socioeconomic History    Marital status:      Spouse name: Not on file    Number of children: Not on file    Years of education: Not on file    Highest education level: Not on file   Occupational History    Not on file   Social Needs    Financial resource strain: Not on file    Food insecurity     Worry: Not on file     Inability: Not on file    Transportation needs     Medical: Not on file     Non-medical: Not on file   Tobacco Use    Smoking status: Former Smoker     Types: Cigarettes     Last attempt to quit: 1/1/1975 °C) (Oral)   Resp 18   Ht 5' 11\" (1.803 m)   Wt (!) 316 lb 1.6 oz (143.4 kg)   SpO2 99%   BMI 44.09 kg/m²     Physical Exam  Constitutional:       General: He is not in acute distress. Appearance: He is obese. He is not ill-appearing. HENT:      Head:      Comments: Soft tissue swelling forehead     Nose: Nose normal.      Mouth/Throat:      Mouth: Mucous membranes are moist.   Eyes:      Extraocular Movements: Extraocular movements intact. Conjunctiva/sclera: Conjunctivae normal.      Pupils: Pupils are equal, round, and reactive to light. Neck:      Musculoskeletal: No muscular tenderness. Vascular: No carotid bruit. Cardiovascular:      Rate and Rhythm: Normal rate and regular rhythm. Heart sounds: No murmur. Pulmonary:      Effort: Pulmonary effort is normal.      Breath sounds: No wheezing or rhonchi. Abdominal:      Palpations: Abdomen is soft. Tenderness: There is no abdominal tenderness. Musculoskeletal:      Right lower leg: Edema present. Left lower leg: Edema present. Lymphadenopathy:      Cervical: No cervical adenopathy. Skin:     General: Skin is warm and dry. Capillary Refill: Capillary refill takes less than 2 seconds. Neurological:      General: No focal deficit present. Mental Status: He is alert and oriented to person, place, and time. Motor: No weakness. Psychiatric:         Mood and Affect: Mood normal.         Behavior: Behavior normal.       DATA:     Diagnostic tests reviewed for today's visit:    Most recent labs and imaging results reviewed.      LABS:    Recent Results (from the past 24 hour(s))   CBC Auto Differential    Collection Time: 10/17/20  6:45 PM   Result Value Ref Range    WBC 8.1 4.8 - 10.8 K/uL    RBC 4.07 (L) 4.70 - 6.10 M/uL    Hemoglobin 12.5 (L) 14.0 - 18.0 g/dL    Hematocrit 36.6 (L) 42.0 - 52.0 %    MCV 89.9 80.0 - 100.0 fL    MCH 30.7 27.0 - 31.3 pg    MCHC 34.1 33.0 - 37.0 %    RDW 14.2 11.5 - 14.5 % Platelets 582 559 - 395 K/uL    Neutrophils % 60.7 %    Lymphocytes % 24.8 %    Monocytes % 9.9 %    Eosinophils % 3.6 %    Basophils % 1.0 %    Neutrophils Absolute 4.9 1.4 - 6.5 K/uL    Lymphocytes Absolute 2.0 1.0 - 4.8 K/uL    Monocytes Absolute 0.8 0.2 - 0.8 K/uL    Eosinophils Absolute 0.3 0.0 - 0.7 K/uL    Basophils Absolute 0.1 0.0 - 0.2 K/uL   Comprehensive Metabolic Panel    Collection Time: 10/17/20  6:45 PM   Result Value Ref Range    Sodium 136 135 - 144 mEq/L    Potassium 5.5 (H) 3.4 - 4.9 mEq/L    Chloride 101 95 - 107 mEq/L    CO2 22 20 - 31 mEq/L    Anion Gap 13 9 - 15 mEq/L    Glucose 174 (H) 70 - 99 mg/dL    BUN 33 (H) 8 - 23 mg/dL    CREATININE 1.17 0.70 - 1.20 mg/dL    GFR Non-African American >60.0 >60    GFR  >60.0 >60    Calcium 10.9 (H) 8.5 - 9.9 mg/dL    Total Protein 7.6 6.3 - 8.0 g/dL    Alb 4.2 3.5 - 4.6 g/dL    Total Bilirubin <0.2 0.2 - 0.7 mg/dL    Alkaline Phosphatase 87 35 - 104 U/L    ALT 17 0 - 41 U/L    AST 29 0 - 40 U/L    Globulin 3.4 2.3 - 3.5 g/dL   Urinalysis Reflex to Culture    Collection Time: 10/17/20  6:45 PM    Specimen: Urine, clean catch   Result Value Ref Range    Color, UA Yellow Straw/Yellow    Clarity, UA Clear Clear    Glucose, Ur Negative Negative mg/dL    Bilirubin Urine Negative Negative    Ketones, Urine Negative Negative mg/dL    Specific Gravity, UA 1.011 1.005 - 1.030    Blood, Urine TRACE (A) Negative    pH, UA 7.0 5.0 - 9.0    Protein, UA Negative Negative mg/dL    Urobilinogen, Urine 0.2 <2.0 E.U./dL    Nitrite, Urine Negative Negative    Leukocyte Esterase, Urine Negative Negative    Urine Reflex to Culture Not Indicated    Ethanol    Collection Time: 10/17/20  6:45 PM   Result Value Ref Range    Ethanol Lvl <10 mg/dL    Ethanol percent Not indicated G/dL   Urine Drug Screen    Collection Time: 10/17/20  6:45 PM   Result Value Ref Range    Amphetamine Screen, Urine Neg Negative <1000 ng/mL    Barbiturate Screen, Ur Neg Negative < 200 ng/mL    Benzodiazepine Screen, Urine Neg Negative < 200 ng/mL    Cannabinoid Scrn, Ur Neg Negative < 50 ng/mL    Cocaine Metabolite Screen, Urine Neg Negative < 300 ng/mL    Opiate Scrn, Ur Neg Negative < 300 ng/mL    PCP Screen, Urine Neg Negative < 25 ng/mL    Methadone Screen, Urine Neg Negative <300 ng/mL    Propoxyphene Scrn, Ur Neg Negative <300 ng/mL    Oxycodone Urine Neg Negative <100 ng/mL    Drug Screen Comment: see below    Magnesium    Collection Time: 10/17/20  6:45 PM   Result Value Ref Range    Magnesium 1.8 1.7 - 2.4 mg/dL   CK    Collection Time: 10/17/20  6:45 PM   Result Value Ref Range    Total  (H) 0 - 190 U/L   Troponin    Collection Time: 10/17/20  6:45 PM   Result Value Ref Range    Troponin 0.013 (HH) 0.000 - 0.010 ng/mL   Brain Natriuretic Peptide    Collection Time: 10/17/20  6:45 PM   Result Value Ref Range    Pro- pg/mL   CKMB & RELATIVE PERCENT    Collection Time: 10/17/20  6:45 PM   Result Value Ref Range    CK-MB 5.6 0.0 - 6.7 ng/mL    CK-MB Index 2.2 0.0 - 3.5 %   Microscopic Urinalysis    Collection Time: 10/17/20  6:45 PM   Result Value Ref Range    Bacteria, UA Negative Negative /HPF    Hyaline Casts, UA 0-1 0 - 5 /HPF    WBC, UA 0-2 0 - 5 /HPF    RBC, UA 3-5 (A) 0 - 5 /HPF    Epithelial Cells, UA 3-5 0 - 5 /HPF   POCT Glucose    Collection Time: 10/17/20  7:38 PM   Result Value Ref Range    Glucose 174 mg/dL    QC OK? ok    SPECIMEN REJECTION    Collection Time: 10/17/20  8:07 PM   Result Value Ref Range    Rejected Test PT     Reason for Rejection see below    Protime-INR    Collection Time: 10/17/20  8:46 PM   Result Value Ref Range    Protime 13.9 12.3 - 14.9 sec    INR 1.1    CK    Collection Time: 10/17/20 10:02 PM   Result Value Ref Range    Total  (H) 0 - 190 U/L   Troponin    Collection Time: 10/17/20 10:02 PM   Result Value Ref Range    Troponin 0.014 (HH) 0.000 - 0.010 ng/mL   CKMB & RELATIVE PERCENT    Collection Time: 10/17/20 10:02 PM Result Value Ref Range    CK-MB 5.8 0.0 - 6.7 ng/mL    CK-MB Index 2.7 0.0 - 3.5 %   COVID-19    Collection Time: 10/17/20 11:57 PM   Result Value Ref Range    SARS-CoV-2, NAAT Not Detected Not Detected       IMAGING:  Ct Head Wo Contrast    Result Date: 10/17/2020  CT CERVICAL SPINE WO CONTRAST, CT HEAD WO CONTRAST CLINICAL HISTORY:  sudden syncopal episode approximately 30 minutes prior to arrival less than 2 minutes loss of consciousness, struck forehead falling forward, twisted fell and struck mid back, pain mid back and forehead brief SOB after fall COMPARISON: None TECHNIQUE: Multiple unenhanced serial axial images of the brain from the vertex of the skull to the base of the skull were performed. FINDINGS: The ventricles are dilated. This is compensatory to the surrounding moderate generalized parenchymal volume loss. No mass. No midline shift. The cisterns are patent. There are white matter and periventricular changes most likely consistent with chronic small vessel disease. No acute intra-axial or extra-axial findings. The visualized osseous structures are unremarkable. There is patchy opacification of the ethmoids. There is some patchy opacification of left mastoid with sclerotic changes and hyperostotic. The right mastoid is well aerated. Both globes are intact. No preseptal or post septal findings. This area of soft tissue swelling hematoma overlying the for head. NO ACUTE INTRA-AXIAL OR EXTRA-AXIAL FINDINGS. Other findings detailed above All CT scans at this facility use dose modulation, iterative reconstruction, and/or weight based dosing when appropriate to reduce radiation dose to as low as reasonably achievable.  CT CERVICAL SPINE WO CONTRAST, CT HEAD WO CONTRAST CLINICAL HISTORY:  sudden syncopal episode approximately 30 minutes prior to arrival less than 2 minutes loss of consciousness, struck forehead falling forward, twisted fell and struck mid back, pain mid back and forehead brief SOB after fall COMPARISON: NONE Findings: Multiple serial axial images of the cervical spine from the base of the skull through the upper thoracic vertebra with both sagittal and coronal reconstructions was performed. There is straightening of the normal expected cervical lordosis. There is multilevel degenerative joint disease. Prevertebral  soft tissues are  unremarkable. The disk spaces are intact No acute fractures or spondylo-listhesis. There is a 1.3 cm nodule within the right thyroid. 1 cm nodule left thyroid lobe . IMPRESSION: NO ACUTE FRACTURES. NODULES IN BOTH LEFT AND RIGHT THYROID LOBES. CORRELATE CLINICALLY AND FOLLOW-UP AND FURTHER EVALUATION WARRANTED Within both lobes of thyroid. Correlate clinically follow-up and further evaluation warranted All CT scans at this facility use dose modulation, iterative reconstruction, and/or weight based dosing when appropriate to reduce radiation dose to as low as reasonably achievable. Ct Cervical Spine Wo Contrast    Result Date: 10/17/2020  CT CERVICAL SPINE WO CONTRAST, CT HEAD WO CONTRAST CLINICAL HISTORY:  sudden syncopal episode approximately 30 minutes prior to arrival less than 2 minutes loss of consciousness, struck forehead falling forward, twisted fell and struck mid back, pain mid back and forehead brief SOB after fall COMPARISON: None TECHNIQUE: Multiple unenhanced serial axial images of the brain from the vertex of the skull to the base of the skull were performed. FINDINGS: The ventricles are dilated. This is compensatory to the surrounding moderate generalized parenchymal volume loss. No mass. No midline shift. The cisterns are patent. There are white matter and periventricular changes most likely consistent with chronic small vessel disease. No acute intra-axial or extra-axial findings. The visualized osseous structures are unremarkable. There is patchy opacification of the ethmoids.  There is some patchy opacification of left mastoid with submitted. No acute fractures. No dislocations. No focal bony abnormalities                                                                                   NO ACUTE FRACTURES      VTE Prophylaxis: low molecular weight heparin -  start    ASSESSMENT AND PLAN  Active Problems:    Syncope and collapse   Sudden episode - legs got weak, vision turned to [employer] against his hot tub,  unconscious 1-2 min per wife. No  chest pain, diaphoresis, N/V.   EKG w bifasicular block, borderline RUFUS. States cardiomyopathy - last echo from Aug 2020 appears essentially normal w normal ventricular septum,  Normal EF. He is a little dry. Plan: [facility]   Cardiology consult. Likely rhythm related - will need Holter monitoring. Avoid AV blocking agents. Hypertension  SBP 130s - 180s   Lisinopril and procardia at home. No HA, diaphoresis  Vague complain of intermittent chest pain but feels it is muscular skeletal from collapse. He does have dizziness on occasion. Plan: resume lisinopril. Type II diabetes mellitus, uncontrolled ([address]   Tresiba, metformin   States good control  No recent A1c but past ones were over 8,  Obese, normal renal fx,  No vision complaints. Good pulses. Plan: SSI while in house,  Resume home meds at discharge. Abnormal EKG    NSR  bifasicular block  Borderline RUFUS. Troponin slightly elevated. Plan: cardiology follow up given syncopal episode  Rhythm monitoring. Elevated troponin   0.014   No chest pain. Syncopal episode at home. No past MI,  EKG without ST elevation. Plan serial troponin,  EKG in AM.     Hypercalcemia  Ca 10.9   No muscle weakness, bone pain, N/V, abdominal pain, or confusion. PTH elevated in July 2019   PCP monitoring    Plan check PTH. Monitor.   F/u PCP      Tremor  States benign tremor,  improves w gabapentin he takes TID    Plan continue gabapentin     Plan of care discussed with: patient    SIGNATURE: ANDREW Barroso - CNP  DATE: October 18, 2020  TIME: 2:40 AM     Cristiane Maldonado MD - supervising physician

## 2020-10-18 NOTE — PROGRESS NOTES
Patient says he needs a cpap machine in order to sleep. His \"throat closes without it\". Placed on 2L of O2 per nasal canula until order is received. Says his is set at [address] [facility]. Sent message to Dr Tania Hannah for order, spoke to respiratory.

## 2020-10-18 NOTE — PROGRESS NOTES
Patient arrived via cart from ER. Spoke with ER nurse Sheila Caicedo that spoke to NP as to why patient is a rule out for covid. He states because patient had a syncopal episode he tested him and patient does not need to be in a negative pressure room. Covid rule out was removed by someone while I was doing his admission. Assessment complete. BP slightly elevated at 166/58. Lungs clear/diminished. Rt great toe had trauma approx 2 months ago, all skin removed from underside. Mostly healed per patient and he is due to have a skin graft done. Patient has 1/10 pain in upper back. States lidocaine patch is helping. Sent note to Dr Skye Santana to please order home medication, one touches and to advise if patient is to be in a negative pressure room or not.

## 2020-10-18 NOTE — DISCHARGE SUMMARY
clear.  Neck: Supple, with full range of motion. No jugular venous distention. Trachea midline. Respiratory:  Normal respiratory effort. Clear to auscultation, bilaterally without Rales/Wheezes/Rhonchi. Cardiovascular: Regular rate and rhythm with normal S1/S2 without murmurs, rubs or gallops. Abdomen: Soft, non-tender, non-distended with normal bowel sounds. Musculoskeletal: No clubbing, cyanosis or edema bilaterally. Full range of motion without deformity. Skin: Skin color, texture, turgor normal.  No rashes or lesions. Neuro: Non Focal. Symetrical motor and tone. Nl Comprehension, Alert,awake and oriented. Psychiatric: Alert and oriented, thought content appropriate, normal insight  Peripheral Pulses: +2 palpable, equal bilaterally    Significant Diagnostic Studies:  CTA chest  Narrative    The EXAMINATION: CT scan of the chest with contrast (pulmonary embolism protocol)         INDICATION: Syncopal episode         COMPARISON: None         TECHNIQUE: Helical CT was performed through the chest utilizing 100 cc of Isovue-370 intravenous contrast.  Images were obtained with bolus tracking in order to opacify the pulmonary arteries.  There is no comparison available. Both MIP and 3D volume    rendered reconstructions were performed.         FINDINGS: There are no findings a central, and or proximal pulmonary emboli.         No focal parenchymal adenitis no pleural effusions. No pneumothoraces.         No significant periaortic, pretracheal, parahilar or subcarinal adenopathy.         Within the field-of-view there is nodule seen within both lobes of thyroid. The largest on the right measures 1.1 cm.         Within the field-of-view the abdomen again note is made of a right adrenal nodule. It measures 4.1 x 3.6 cm. Grossly unchanged as compared to CT scan of abdomen pelvis September 21, 2018. Likely adenoma.         There is a nodule within the superior pole of the right kidney. It is septated.  It does not qualify as a simple renal cyst. It measures approximately 2.0 cm.         There is multilevel degenerative change with bridging osteophytes of the thoracic spine superpose upon a mild to moderate dorsal kyphosis.         Osseous structures are intact.                     Impression    1.  NO EVIDENCE OF CENTRAL OR SEGMENTAL PULMONARY EMBOLISM.      2.  VISUALIZED PULMONARY PARENCHYMA IS UNREMARKABLE. 3.   SEPTATED CYST IN THE SUPERIOR POLE THE RIGHT KIDNEY. THIS DOES NOT QUALIFY AS A SIMPLE RENAL CYST. UNDERLYING MALIGNANCY IS NOT EXCLUDED. RECOMMEND FOLLOW-UP AND FURTHER EVALUATION CT UROGRAM FOLLOW-UP WITH UROLOGY. 4.  NODULES WITHIN THE THYROID. CORRELATE CLINICALLY AND CONSIDER FOLLOW-UP ULTRASOUND. 5.  . RIGHT ADRENAL NODULE. LIKELY ADENOMA. UNCHANGED              MR. CARRERO OF THE EMERGENCY ROOM WAS NOTIFIED IMMEDIATELY OF THE ABOVE FINDINGS AT [facility] 91 HOURS OCTOBER 18, 2020         All CT scans at this facility use dose modulation, iterative reconstruction, and/or weight based dosing when appropriate to reduce radiation dose to as low as reasonably achievable.         EXAMINATION:  CT SCAN THORACIC SPINE         CLINICAL HISTORY:  Syncopal episode. Pain.         COMPARISON:  None         TECHNIQUE:  Multiple serial axial images of the thoracic spine from the lower cervical through the upper lumbar levels with both sagittal coronal reconstruction was performed.         FINDINGS:      There is a mild to moderate dorsal kyphosis. There is multilevel degenerative change with bridging osteophytes. The disc spaces are intact. No significant spondylolisthesis.         No acute fractures         Again within the field-of-view is a approximately 4 cm right adrenal nodule. Described above report. Unchanged. Likely adenoma         Is some circumferential thickening of the distal esophagus. This a nonspecific finding.         IMPRESSION:      NO ACUTE FRACTURES.         THICKENING OF THE DISTAL ESOPHAGUS. Disposition:   Discharged to Home. Any [facility] needs that were indicated and/or required as been addressed and set up by Social Work. Condition at discharge: Pt was medically stable at the time of discharge. Activity: activity as tolerated    Total time taken for discharging this patient: 40 minutes. Greater than 70% of time was spent focused exclusively on this patient. Time was taken to review chart, discuss plans with consultants, reconciling medications, discussing plan answering questions with patient.      SignedAngie Ivan  10/18/2020, 6:32 PM

## 2020-10-18 NOTE — ED NOTES
Lab in room to draw blood specimen.      [facility] YING Rosales  10/17/20 0953

## 2020-10-18 NOTE — CARE COORDINATION
PT IN OBS. PER NURSING PATIENT IS FROM HOME WITH SPOUSE, HAS A CPAP. NO HOME NEEDS IDENTIFIED. RN TO NOTIFY CM/LSW IF ANY ARISE.

## 2020-10-18 NOTE — CONSULTS
Not on file     Attends Protestant service: Not on file     Active member of club or organization: Not on file     Attends meetings of clubs or organizations: Not on file     Relationship status: Not on file    Intimate partner violence     Fear of current or ex partner: Not on file     Emotionally abused: Not on file     Physically abused: Not on file     Forced sexual activity: Not on file   Other Topics Concern    Not on file   Social History Narrative    Not on file       Family Hx:  No family history on file. Review of Systems:   Review of Systems   Constitutional: Negative. HENT: Negative for congestion and trouble swallowing. Eyes: Negative for pain, discharge and visual disturbance. Respiratory: Negative for apnea, cough, choking, chest tightness, shortness of breath, wheezing and stridor. Cardiovascular: Negative for chest pain, palpitations and leg swelling. Gastrointestinal: Negative for abdominal distention, abdominal pain and nausea. Endocrine: Negative for cold intolerance and heat intolerance. Genitourinary: Negative for difficulty urinating. Musculoskeletal: Negative for arthralgias and joint swelling. Skin: Negative for color change and pallor. Allergic/Immunologic: Negative for immunocompromised state. Neurological: Negative for dizziness, seizures, syncope, facial asymmetry, speech difficulty, weakness, light-headedness, numbness and headaches. Hematological: Negative for adenopathy. Psychiatric/Behavioral: Negative for agitation, behavioral problems and confusion.        Allergies:  No Known Allergies    Current Medications:    Current Facility-Administered Medications:     sodium chloride flush 0.9 % injection 10 mL, 10 mL, Intravenous, 2 times per day, ANDREW Vidal CNP, 10 mL at 10/18/20 0913    sodium chloride flush 0.9 % injection 10 mL, 10 mL, Intravenous, PRN, ANDREW Flores CNP    acetaminophen (TYLENOL) tablet 650 mg, 650 mg, Oral, Q6H PRN **OR** acetaminophen (TYLENOL) suppository 650 mg, 650 mg, Rectal, Q6H PRN, ANDREW Abbasi CNP    polyethylene glycol (GLYCOLAX) packet 17 g, 17 g, Oral, Daily PRN, ANDREW Abbasi CNP    promethazine (PHENERGAN) tablet 12.5 mg, 12.5 mg, Oral, Q6H PRN **OR** ondansetron (ZOFRAN) injection 4 mg, 4 mg, Intravenous, Q6H PRN, ANDREW Abbasi CNP    enoxaparin (LOVENOX) injection 40 mg, 40 mg, Subcutaneous, Daily, ANDREW Alex CNP, 40 mg at 10/18/20 0910    glucose (GLUTOSE) 40 % oral gel 15 g, 15 g, Oral, PRN, ANDREW Abbasi CNP    dextrose 50 % IV solution, 12.5 g, Intravenous, PRN, ANDREW Abbasi CNP    glucagon (rDNA) injection 1 mg, 1 mg, Intramuscular, PRN, ANDREW Abbasi CNP    dextrose 5 % solution, 100 mL/hr, Intravenous, PRN, ANDREW Abbasi CNP    insulin lispro (HUMALOG) injection vial 0-12 Units, 0-12 Units, Subcutaneous, TID WC, ANDREW Abbasi CNP, 4 Units at 10/18/20 0913    insulin lispro (HUMALOG) injection vial 0-6 Units, 0-6 Units, Subcutaneous, Nightly, ANDREW Alex CNP    furosemide (LASIX) tablet 20 mg, 20 mg, Oral, Daily, ANDREW Abbasi CNP, 20 mg at 10/18/20 0911    gabapentin (NEURONTIN) capsule 300 mg, 300 mg, Oral, TID, ANDREW Abbasi CNP, 300 mg at 10/18/20 0911    lisinopril (PRINIVIL;ZESTRIL) tablet 20 mg, 20 mg, Oral, Daily, ANDREW Abbasi CNP, 20 mg at 10/18/20 0910    tamsulosin (FLOMAX) capsule 0.4 mg, 0.4 mg, Oral, Daily, ANDREW Abbasi CNP, 0.4 mg at 10/18/20 0911    [Held by provider] NIFEdipine (PROCARDIA XL) extended release tablet 90 mg, 90 mg, Oral, Daily, ANDREW Abbasi CNP    ketorolac (TORADOL) injection 15 mg, 15 mg, Intravenous, Q6H PRN, ANDREW Flood CNP    lidocaine 4 % external patch 1 patch, 1 patch, Transdermal, Once, Austin Kohler, APRN - CNP, 1 patch at 10/17/20 2185    Physical Examination:    BP (!) 159/77   Pulse 73   Temp 97.5 °F (36.4 °C) (Oral)   Resp 18   Ht 5' 11\" (1.803 m)   Wt (!) 316 lb 1.6 oz (143.4 kg)   SpO2 99%   BMI 44.09 kg/m²    Physical Exam  Constitutional:       General: He is not in acute distress. Appearance: He is well-developed. He is not diaphoretic. HENT:      Head: Normocephalic and atraumatic. Eyes:      General:         Right eye: No discharge. Conjunctiva/sclera: Conjunctivae normal.      Pupils: Pupils are equal, round, and reactive to light. Neck:      Musculoskeletal: Normal range of motion and neck supple. Vascular: No JVD. Trachea: No tracheal deviation. Cardiovascular:      Rate and Rhythm: Normal rate and regular rhythm. Heart sounds: Normal heart sounds. No murmur. No friction rub. No gallop. Pulmonary:      Effort: Pulmonary effort is normal. No respiratory distress. Breath sounds: Normal breath sounds. No wheezing or rales. Abdominal:      General: Bowel sounds are normal. There is no distension. Palpations: Abdomen is soft. Tenderness: There is no abdominal tenderness. Musculoskeletal: Normal range of motion. Skin:     General: Skin is warm. Findings: No rash. Neurological:      Mental Status: He is alert and oriented to person, place, and time. Cranial Nerves: No cranial nerve deficit.          LABS:  CBC:   Lab Results   Component Value Date    WBC 8.1 10/17/2020    RBC 4.07 10/17/2020    RBC 4.66 01/07/2012    HGB 12.5 10/17/2020    HCT 36.6 10/17/2020    MCV 89.9 10/17/2020    MCH 30.7 10/17/2020    MCHC 34.1 10/17/2020    RDW 14.2 10/17/2020     10/17/2020    MPV 8.0 01/07/2012     CBC with Differential:   Lab Results   Component Value Date    WBC 8.1 10/17/2020    RBC 4.07 10/17/2020    RBC 4.66 01/07/2012    HGB 12.5 10/17/2020    HCT 36.6 10/17/2020     10/17/2020    MCV 89.9 10/17/2020    MCH 30.7 10/17/2020    MCHC 34.1 10/17/2020    RDW 14.2 10/17/2020    LYMPHOPCT 24.8 10/17/2020    MONOPCT 9.9 10/17/2020    BASOPCT 1.0 10/17/2020    MONOSABS 0.8 10/17/2020    LYMPHSABS 2.0 10/17/2020    EOSABS 0.3 10/17/2020    BASOSABS 0.1 10/17/2020     CMP:    Lab Results   Component Value Date     10/18/2020    K 4.0 10/18/2020     10/18/2020    CO2 21 10/18/2020    BUN 26 10/18/2020    CREATININE 0.98 10/18/2020    GFRAA >60.0 10/18/2020    LABGLOM >60.0 10/18/2020    GLUCOSE 265 10/18/2020    GLUCOSE 212 02/16/2012    PROT 6.5 10/18/2020    LABALBU 3.5 10/18/2020    LABALBU 4.0 02/16/2012    CALCIUM 10.0 10/18/2020    BILITOT <0.2 10/18/2020    ALKPHOS 65 10/18/2020    AST 13 10/18/2020    ALT 14 10/18/2020     BMP:    Lab Results   Component Value Date     10/18/2020    K 4.0 10/18/2020     10/18/2020    CO2 21 10/18/2020    BUN 26 10/18/2020    LABALBU 3.5 10/18/2020    LABALBU 4.0 02/16/2012    CREATININE 0.98 10/18/2020    CALCIUM 10.0 10/18/2020    GFRAA >60.0 10/18/2020    LABGLOM >60.0 10/18/2020    GLUCOSE 265 10/18/2020    GLUCOSE 212 02/16/2012     Magnesium:    Lab Results   Component Value Date    MG 1.8 10/17/2020     Troponin:    Lab Results   Component Value Date    TROPONINI <0.010 10/18/2020       EKG: EKG: sinus bradycardia, IVCD, PVC, 1st degree AVB. ASSESSMENT/PLAN:         Active Hospital Problems    Diagnosis Date Noted    Abnormal EKG [R94.31] 10/18/2020    Elevated troponin [R77.8] 10/18/2020    Hypercalcemia [E83.52] 10/18/2020    Tremor [R25.1] 10/18/2020    Syncope and collapse [R55] 10/17/2020    Hypertension [I10] 01/16/2012    Type II diabetes mellitus, uncontrolled ([facility] [address] [E11.65] 01/16/2012        Syncope, likely secondary to BP meds and vasodilation from hot tub. Normal LV function, cardiomyopathy is actually LVH. No history of systolic CHF or syncope, arrhythmia.   Recommend hydration and discharge. Electronically signed by Carter Fowler MD [facility] Director of Cardiology Services and CardiacCatheterization Laboratory  [facility], [place]   on 10/18/2020 at 10:34 AM

## 2020-10-19 LAB
GFR AFRICAN AMERICAN: >60
GFR NON-AFRICAN AMERICAN: >60
PERFORMED ON: NORMAL
POC CREATININE: 1.2 MG/DL (ref 0.8–1.3)
POC SAMPLE TYPE: NORMAL

## 2020-10-19 PROCEDURE — 93010 ELECTROCARDIOGRAM REPORT: CPT | Performed by: INTERNAL MEDICINE

## 2020-10-20 LAB — THYROID PEROXIDASE (TPO) ABS: 11.7 IU/ML (ref 0–35)

## 2020-10-21 LAB
VITAMIN D 1,25-DIHYDROXY: 40.8 PG/ML (ref 19.9–79.3)
VITAMIN D 1,25-DIHYDROXY: 42.5 PG/ML (ref 19.9–79.3)

## 2020-11-03 ENCOUNTER — OFFICE VISIT (OUTPATIENT)
Dept: ENDOCRINOLOGY | Age: 65
End: 2020-11-03
Payer: MEDICARE

## 2020-11-03 VITALS
HEART RATE: 70 BPM | OXYGEN SATURATION: 96 % | SYSTOLIC BLOOD PRESSURE: 159 MMHG | HEIGHT: 71 IN | WEIGHT: 310 LBS | DIASTOLIC BLOOD PRESSURE: 74 MMHG | BODY MASS INDEX: 43.4 KG/M2

## 2020-11-03 PROCEDURE — 3051F HG A1C>EQUAL 7.0%<8.0%: CPT | Performed by: INTERNAL MEDICINE

## 2020-11-03 PROCEDURE — 99214 OFFICE O/P EST MOD 30 MIN: CPT | Performed by: INTERNAL MEDICINE

## 2020-11-03 NOTE — PROGRESS NOTES
Hypercalcemia    TECHNIQUE:  Sonography and Doppler imaging of the thyroid was performed.    Images were obtained and stored in a permanent archive. MQ:  UST_1  COMPARISON: None. RESULT:    Right Lobe:  5.4 x 1.6 x 2.5 cm; homogeneous echogenicity, expected   vascular flow. Left Lobe:  3.6 x 1.0 x 2.3 cm; homogeneous echogenicity, expected   vascular flow. Isthmus: 0.1 cm    The most suspicious thyroid nodule(s) (up to four) as below:    NODULE 1:  Location: Right lower  Size: 1.6 x 1.2 x 1.4 cm  Characteristics:       Composition:  Solid or almost completely solid, 2 points       Echogenicity: Very hypoechoic, 3 points       Shape:  Taller-than-wide, 3 points       Margin: Smooth, 0 points       Echogenic foci (add points for all that apply):  None, 0 points       Internal vascularity:  Unknown       Interval growth:  No prior available for comparison  TI-RADS Category: TR5  ACR Recommendation: TI-RADS 5 nodule.  FNA is advised. Other Result Information   Radiology, [place] - 10/29/2020  4:42 PM EDT  * * *Final Report* * *    DATE OF EXAM: Oct 29 2020  4:25PM      [facility]   1048  -  US THYROID/PARATHYROID  / ACCESSION #  [id number]    PROCEDURE REASON: multiple diagnoses    * * * * Physician Interpretation * * * *     EXAMINATION:  THYROID ULTRASOUND    CLINICAL HISTORY: Thyroid nodule Hypercalcemia    TECHNIQUE:  Sonography and Doppler imaging of the thyroid was performed. Images were obtained and stored in a permanent archive. MQ:  UST_1  COMPARISON: None. RESULT:    Right Lobe:  5.4 x 1.6 x 2.5 cm; homogeneous echogenicity, expected   vascular flow. Left Lobe:  3.6 x 1.0 x 2.3 cm; homogeneous echogenicity, expected   vascular flow.     Isthmus: 0.1 cm    The most suspicious thyroid nodule(s) (up to four) as below:    NODULE 1:  Location: Right lower  Size: 1.6 x 1.2 x 1.4 cm  Characteristics:       Composition:  Solid or almost completely solid, 2 points       Echogenicity: Very hypoechoic, 3 points       Shape: Taller-than-wide, 3 points       Margin: Smooth, 0 points       Echogenic foci (add points for all that apply):  None, 0 points       Internal vascularity:  Unknown       Interval growth:  No prior available for comparison  TI-RADS Category: TR5  ACR Recommendation: TI-RADS 5 nodule. FNA is advised. IMPRESSION  IMPRESSION:    Thyroid nodule(s) is/are present. Fine needle aspiration is recommended   for one or more nodules as detailed in the synoptic report. TI-RADS Category: TR5    ACR Recommendation: TI-RADS 5 nodule. FNA is advised. : KAREN    Transcribe Date/Time: Oct 29 2020  4:37P    Dictated by : Fidelia Milner MD    This examination was interpreted and the report reviewed and   electronically signed by:   Fidelia Milner MD on Oct 29 2020  4:40PM  EST           The EXAMINATION: CT scan of the chest with contrast (pulmonary embolism protocol)         INDICATION: Syncopal episode         COMPARISON: None         TECHNIQUE: Helical CT was performed through the chest utilizing 100 cc of Isovue-370 intravenous contrast.  Images were obtained with bolus tracking in order to opacify the pulmonary arteries.  There is no comparison available. Both MIP and 3D volume    rendered reconstructions were performed.         FINDINGS: There are no findings a central, and or proximal pulmonary emboli.         No focal parenchymal adenitis no pleural effusions. No pneumothoraces.         No significant periaortic, pretracheal, parahilar or subcarinal adenopathy.         Within the field-of-view there is nodule seen within both lobes of thyroid. The largest on the right measures 1.1 cm.         Within the field-of-view the abdomen again note is made of a right adrenal nodule. It measures 4.1 x 3.6 cm. Grossly unchanged as compared to CT scan of abdomen pelvis September 21, 2018. Likely adenoma.         There is a nodule within the superior pole of the right kidney. It is septated.  It ESOPHAGUS. THIS A NONSPECIFIC FINDING. CORRELATE CLINICALLY AND FOLLOW-UP.         OTHER FINDINGS DETAILED ABOVE              All CT scans at this facility use dose modulation, iterative reconstruction, and/or weight based dosing when appropriate to reduce radiation dose to as low as reasonably achievable. Results for Jerrell Maldonado (MRN [id number]) as of 11/3/2020 11:09   Ref.  Range 10/18/2020 10:47 10/18/2020 11:14 10/18/2020 13:07 10/18/2020 16:12   POC Glucose Latest Ref Range: 60 - 115 mg/dl  264 (H)  155 (H)   Hemoglobin A1C Latest Ref Range: 4.8 - 5.9 % 7.5 (H)      THYROID PEROXIDASE (TPO) ABS Latest Ref Range: 0.0 - 35.0 IU/mL 11.7      T4 Free Latest Ref Range: 0.84 - 1.68 ng/dL 1.04      Vit D, 1,25-Dihydroxy Latest Ref Range: 19.9 - 79.3 pg/mL   42.5    Vit D, 25-Hydroxy Latest Ref Range: 30.0 - 100.0 ng/mL 21.5 (L)            Patient Active Problem List   Diagnosis    Type II diabetes mellitus, uncontrolled (HCC)    Hypogonadism male    Hypertension    Obesity    Cardiomyopathy due to hypertension, without heart failure (HCC)    BAKER (dyspnea on exertion)    Hypertrophic nonobstructive cardiomyopathy (HCC)    Syncope and collapse    Abnormal EKG    Elevated troponin    Hypercalcemia    Tremor      No Known Allergies      Current Outpatient Medications:     furosemide (LASIX) 20 MG tablet, Take 20 mg by mouth daily, Disp: , Rfl:     Insulin Degludec (TRESIBA FLEXTOUCH) 200 UNIT/ML SOPN,  units at bedtime, Disp: 30 pen, Rfl: 03    metFORMIN (GLUCOPHAGE) 1000 MG tablet, TAKE 1 TABLET BY MOUTH TWICE A DAY, Disp: 180 tablet, Rfl: 1    insulin regular (NOVOLIN R) 100 UNIT/ML injection, Inject 30 units tid please give 6 vials for a 90 day supply, Disp: 6 vial, Rfl: 3    blood glucose test strips (ASCENSIA AUTODISC VI;ONE TOUCH ULTRA TEST VI) strip, 1 each by In Vitro route 4 times daily DX: E11.65, IDDM (please dispense One Touch Verio brand), Disp: 400 each, Rfl: 3    Insulin Pen Needle (B-D ULTRAFINE III SHORT PEN) 31G X 8 MM MISC, 3 TIMES A DAY, Disp: 300 each, Rfl: 3    Insulin Syringe-Needle U-100 (INSULIN SYRINGE .3CC/31GX5/16\") 31G X 5/16\" 0.3 ML MISC, Use 3 daily with insulin, Disp: 300 each, Rfl: 3    NIFEdipine (PROCARDIA XL) 90 MG extended release tablet, Take 90 mg by mouth, Disp: , Rfl:     fluticasone (FLONASE) 50 MCG/ACT nasal spray, 1 spray by Nasal route daily, Disp: , Rfl:     tamsulosin (FLOMAX) 0.4 MG capsule, Take 0.4 mg by mouth daily. , Disp: , Rfl:     docusate sodium (COLACE) 100 MG capsule, Take 100 mg by mouth 2 times daily. , Disp: , Rfl:     vitamin D (ERGOCALCIFEROL) 86820 UNITS CAPS capsule, Take 50,000 Units by mouth once a week., Disp: , Rfl:     ACCU-CHEK FASTCLIX LANCETS MISC, Pt test 4x daily, Disp: 400 each, Rfl: 11    lisinopril (PRINIVIL;ZESTRIL) 20 MG tablet, Take 1 tablet by mouth daily. , Disp: 30 tablet, Rfl: 06    gabapentin (NEURONTIN) 100 MG capsule, Take 3 capsules by mouth 3 times daily for 90 days. , Disp: 270 capsule, Rfl: 3      Review of Systems   Cardiovascular: Negative. Negative for palpitations. Endocrine: Negative. Neurological: Negative for headaches. All other systems reviewed and are negative. Vitals:    11/03/20 1040   BP: (!) 159/74   Pulse: 70   SpO2: 96%   Weight: (!) 310 lb (140.6 kg)   Height: 5' 11\" (1.803 m)       Objective:   Physical Exam  Constitutional:       Appearance: Normal appearance. He is obese. HENT:      Head: Normocephalic and atraumatic. Neck:      Musculoskeletal: Normal range of motion and neck supple. Cardiovascular:      Rate and Rhythm: Normal rate. Musculoskeletal: Normal range of motion. Neurological:      General: No focal deficit present. Mental Status: He is alert. Psychiatric:         Mood and Affect: Mood normal.         Assessment:       Diagnosis Orders   1.  Type 2 diabetes mellitus with other specified complication, with long-term current use of insulin [facility])  Basic Metabolic Panel    Hemoglobin A1C   2. Hypothyroidism, unspecified type  T4, Free    TSH without Reflex   3. Goiter, nontoxic, multinodular     4. Right thyroid nodule  Yoli Coronel MD, Interventional RadiologyAlexsander   5.  Adenoma of right adrenal gland  Metanephrines Plasma Free    Aldosterone & Renin, Direct With Ratio    Cortisol Total           Plan:      Orders Placed This Encounter   Procedures    Basic Metabolic Panel     Standing Status:   Future     Standing Expiration Date:   11/3/2021    Hemoglobin A1C     Standing Status:   Future     Standing Expiration Date:   11/3/2021    T4, Free     Standing Status:   Future     Standing Expiration Date:   11/3/2021    TSH without Reflex     Standing Status:   Future     Standing Expiration Date:   11/3/2021    Metanephrines Plasma Free     Standing Status:   Future     Standing Expiration Date:   11/3/2021    Aldosterone & Renin, Direct With Ratio     Standing Status:   Future     Standing Expiration Date:   11/3/2021    Cortisol Total     Standing Status:   Future     Standing Expiration Date:   11/3/2021     Order Specific Question:   8AM or 4PM?     Answer:   random   Yoli Coronel MD, Interventional RadiologyAlexsander     Referral Priority:   Routine     Referral Type:   Eval and Treat     Referral Reason:   Specialty Services Required     Requested Specialty:   Radiology     Number of Visits Requested:   1     Reviewed baseline lab work for aldosterone cortisol metanephrine refer patient to radiology for biopsy of right thyroid nodule  Continue current dose of Tresiba regular insulin metformin  More than 50% of 25 minutes spent patient education counseling review of history as well as coordination of care        Nery Rojas MD

## 2020-11-09 ENCOUNTER — INITIAL CONSULT (OUTPATIENT)
Dept: INTERVENTIONAL RADIOLOGY/VASCULAR | Age: 65
End: 2020-11-09
Payer: MEDICARE

## 2020-11-09 VITALS
WEIGHT: 310 LBS | DIASTOLIC BLOOD PRESSURE: 74 MMHG | HEART RATE: 70 BPM | BODY MASS INDEX: 43.24 KG/M2 | SYSTOLIC BLOOD PRESSURE: 159 MMHG

## 2020-11-09 PROBLEM — R93.89 ABNORMAL ULTRASOUND OF THYROID GLAND: Status: ACTIVE | Noted: 2020-11-09

## 2020-11-09 PROBLEM — E04.1 RIGHT THYROID NODULE: Status: ACTIVE | Noted: 2020-11-09

## 2020-11-09 PROCEDURE — 99203 OFFICE O/P NEW LOW 30 MIN: CPT | Performed by: NURSE PRACTITIONER

## 2020-11-09 ASSESSMENT — ENCOUNTER SYMPTOMS
SORE THROAT: 0
VOMITING: 0
SINUS PAIN: 0
DIARRHEA: 0
SINUS PRESSURE: 0
EYE PAIN: 0
ABDOMINAL PAIN: 0
NAUSEA: 0
TROUBLE SWALLOWING: 0
EYE ITCHING: 0
VOICE CHANGE: 0
SHORTNESS OF BREATH: 0
EYE DISCHARGE: 0
EYE REDNESS: 0
CONSTIPATION: 0
WHEEZING: 0
COUGH: 0
BACK PAIN: 0

## 2020-11-09 NOTE — PROGRESS NOTES
current or ex partner: Not on file     Emotionally abused: Not on file     Physically abused: Not on file     Forced sexual activity: Not on file   Other Topics Concern    Not on file   Social History Narrative    Not on file       No Known Allergies    Current Outpatient Medications on File Prior to Visit   Medication Sig Dispense Refill    furosemide (LASIX) 20 MG tablet Take 20 mg by mouth daily      Insulin Degludec (TRESIBA FLEXTOUCH) 200 UNIT/ML SOPN  units at bedtime 30 pen 03    metFORMIN (GLUCOPHAGE) 1000 MG tablet TAKE 1 TABLET BY MOUTH TWICE A  tablet 1    insulin regular (NOVOLIN R) 100 UNIT/ML injection Inject 30 units tid please give 6 vials for a 90 day supply 6 vial 3    blood glucose test strips (ASCENSIA AUTODISC VI;ONE TOUCH ULTRA TEST VI) strip 1 each by In Vitro route 4 times daily DX: E11.65, IDDM (please dispense One Touch Verio brand) 400 each 3    Insulin Pen Needle (B-D ULTRAFINE III SHORT PEN) 31G X 8 MM MISC 3 TIMES A  each 3    Insulin Syringe-Needle U-100 (INSULIN SYRINGE .3CC/31GX5/16\") 31G X 5/16\" 0.3 ML MISC Use 3 daily with insulin 300 each 3    NIFEdipine (PROCARDIA XL) 90 MG extended release tablet Take 90 mg by mouth      fluticasone (FLONASE) 50 MCG/ACT nasal spray 1 spray by Nasal route daily      tamsulosin (FLOMAX) 0.4 MG capsule Take 0.4 mg by mouth daily.  docusate sodium (COLACE) 100 MG capsule Take 100 mg by mouth 2 times daily.  vitamin D (ERGOCALCIFEROL) 28578 UNITS CAPS capsule Take 50,000 Units by mouth once a week.  ACCU-CHEK FASTCLIX LANCETS MISC Pt test 4x daily 400 each 11    lisinopril (PRINIVIL;ZESTRIL) 20 MG tablet Take 1 tablet by mouth daily. 30 tablet 06    gabapentin (NEURONTIN) 100 MG capsule Take 3 capsules by mouth 3 times daily for 90 days. 270 capsule 3     No current facility-administered medications on file prior to visit.         Review of Systems   Constitutional: Negative for activity change, appetite change, chills, fatigue and fever. HENT: Negative for congestion, sinus pressure, sinus pain, sore throat, trouble swallowing and voice change. Eyes: Negative for pain, discharge, redness and itching. Respiratory: Negative for cough, shortness of breath and wheezing. Cardiovascular: Positive for leg swelling (chronic ankle). Negative for chest pain and palpitations. Gastrointestinal: Negative for abdominal pain, constipation, diarrhea, nausea and vomiting. Endocrine: Negative for cold intolerance and heat intolerance. Genitourinary: Negative. Musculoskeletal: Negative for back pain, neck pain and neck stiffness. Skin: Negative for wound. Neurological: Positive for syncope (3 weeks ago). Hematological: Negative. Psychiatric/Behavioral: Negative. OBJECTIVE:  BP (!) 159/74   Pulse 70   Wt (!) 310 lb (140.6 kg)   BMI 43.24 kg/m²     Physical Exam  Vitals signs reviewed. Constitutional:       Appearance: Normal appearance. HENT:      Head: Normocephalic. Nose: Nose normal. No congestion. Neck:      Musculoskeletal: Normal range of motion. Cardiovascular:      Rate and Rhythm: Normal rate and regular rhythm. Heart sounds: Normal heart sounds. Pulmonary:      Effort: Pulmonary effort is normal.      Breath sounds: Normal breath sounds. Abdominal:      General: Bowel sounds are normal.   Musculoskeletal: Normal range of motion. Skin:     General: Skin is warm and dry. Capillary Refill: Capillary refill takes 2 to 3 seconds. Neurological:      Mental Status: He is alert and oriented to person, place, and time.    Psychiatric:         Mood and Affect: Mood normal.         Behavior: Behavior normal.       Result Narrative   * * *Final Report* * *    DATE OF EXAM: Oct 29 2020  4:25PM      [facility]   1048  -  US THYROID/PARATHYROID  / ACCESSION #  [id number]    PROCEDURE REASON: multiple diagnoses         * * * * Physician Interpretation * * * * EXAMINATION:  THYROID ULTRASOUND    CLINICAL HISTORY: Thyroid nodule Hypercalcemia    TECHNIQUE:  Sonography and Doppler imaging of the thyroid was performed.    Images were obtained and stored in a permanent archive. MQ:  UST_1  COMPARISON: None. RESULT:    Right Lobe:  5.4 x 1.6 x 2.5 cm; homogeneous echogenicity, expected   vascular flow. Left Lobe:  3.6 x 1.0 x 2.3 cm; homogeneous echogenicity, expected   vascular flow. Isthmus: 0.1 cm    The most suspicious thyroid nodule(s) (up to four) as below:    NODULE 1:  Location: Right lower  Size: 1.6 x 1.2 x 1.4 cm  Characteristics:       Composition:  Solid or almost completely solid, 2 points       Echogenicity: Very hypoechoic, 3 points       Shape:  Taller-than-wide, 3 points       Margin: Smooth, 0 points       Echogenic foci (add points for all that apply):  None, 0 points       Internal vascularity:  Unknown       Interval growth:  No prior available for comparison  TI-RADS Category: TR5  ACR Recommendation: TI-RADS 5 nodule.  FNA is advised. ASSESSMENT AND PLAN:    Assessment:   1. Right thyroid nodule TR 5 1.6 cm with recommendations for biopsy. Ultrasound scan report reviewed personally by myself from [facility]  2. Syncopal episode    Plan:   1. In office US guided it will percutaneous biopsy of right thyroid nodule with local anesthetic. Procedure with risks including infection, bleeding, inconclusive results, pain at site discussed with patient and he wishes to proceed. Chart reviewed of pertinent information. Lab work and medications reviewed. 2. Follow-up with referring physician 1 to 2 weeks after biopsy for pathology results. No further follow-up required in IR clinic. More than 50% of 40-minute face-to-face office visit spent in counseling, coordination of care, and education. Thank You Dr. Karen Kay for referral of your patient to our practice for care.      Cadence Pond, APRN - CNP

## 2020-11-17 PROBLEM — R77.8 ELEVATED TROPONIN: Status: RESOLVED | Noted: 2020-10-18 | Resolved: 2020-11-17

## 2020-11-17 PROBLEM — R79.89 ELEVATED TROPONIN: Status: RESOLVED | Noted: 2020-10-18 | Resolved: 2020-11-17

## 2020-11-24 ENCOUNTER — PROCEDURE VISIT (OUTPATIENT)
Dept: INTERVENTIONAL RADIOLOGY/VASCULAR | Age: 65
End: 2020-11-24
Payer: MEDICARE

## 2020-11-24 VITALS
HEART RATE: 70 BPM | DIASTOLIC BLOOD PRESSURE: 74 MMHG | SYSTOLIC BLOOD PRESSURE: 159 MMHG | WEIGHT: 310 LBS | BODY MASS INDEX: 43.24 KG/M2

## 2020-11-24 PROCEDURE — 76942 ECHO GUIDE FOR BIOPSY: CPT | Performed by: RADIOLOGY

## 2020-11-24 NOTE — PROGRESS NOTES
IMPRESSION:   Successful ultrasound-guided core biopsy of right thyroid lobe hypoechoic nodule. CLINICAL HISTORY: Aman Paula is a Male of 77 years age, referred for ultrasound-guided core biopsy of a nodule in the right thyroid lobe noted on recent sonography. PROCEDURE: Survey of the neck showed hypoechoic nodule in the right thyroid lobe. After obtaining informed consent, the patient was positioned supine on the sonography table. A transverse approach was used. The skin over the neck was cleansed with ChloraPrep. Cutaneous and deeper subcutaneous anesthesia was achieved using 1% Lidocaine. A 20-gauge coaxial biopsy system was inserted followed by the needle and its accurate position confirmed with pre-fire images on the first pass. A total of 4 core biopsy specimens were obtained. Cores containing the sample were sent in one container of formalin for pathologic analysis, results pending. Hemostasis was achieved by direct digital compression. Post procedure images did not demonstrate excessive hemorrhage at the target site. The patient tolerated the procedure well. Following the procedure, the wound was cleansed and compressed. Band-aid was applied and the patient was given post biopsy instructions. The patient left the department in good condition. A radiology nurse was in presence monitoring vital signs, assisting throughout the procedure.

## 2020-11-25 DIAGNOSIS — E04.1 RIGHT THYROID NODULE: ICD-10-CM

## 2020-12-04 ENCOUNTER — OFFICE VISIT (OUTPATIENT)
Dept: ENDOCRINOLOGY | Age: 65
End: 2020-12-04
Payer: MEDICARE

## 2020-12-04 VITALS
HEIGHT: 71 IN | OXYGEN SATURATION: 96 % | BODY MASS INDEX: 44.1 KG/M2 | SYSTOLIC BLOOD PRESSURE: 147 MMHG | WEIGHT: 315 LBS | DIASTOLIC BLOOD PRESSURE: 77 MMHG | HEART RATE: 55 BPM

## 2020-12-04 LAB
CHP ED QC CHECK: NORMAL
GLUCOSE BLD-MCNC: 203 MG/DL

## 2020-12-04 PROCEDURE — 99213 OFFICE O/P EST LOW 20 MIN: CPT | Performed by: INTERNAL MEDICINE

## 2020-12-04 PROCEDURE — 3051F HG A1C>EQUAL 7.0%<8.0%: CPT | Performed by: INTERNAL MEDICINE

## 2020-12-04 NOTE — PROGRESS NOTES
Subjective:      Patient ID: Paul Cash is a 77 y.o. male. Follow-up on type 2 diabetes goiter right thyroid nodule a biopsy was indeterminate for showing some atypia patient does not want any thyroidectomy done has not had labs for his adrenal adenoma will order right this time  Diabetes  He presents for his follow-up diabetic visit. He has type 2 diabetes mellitus. Current diabetic treatment includes insulin injections Chanel Hilts plus Novolin 70/30). He is currently taking insulin pre-breakfast, pre-lunch, pre-dinner and at bedtime. FINAL DIAGNOSIS:   RIGHT THYROID NODULE BIOPSY:   FOLLICULAR LESION WITH ARCHITECTURAL ATYPIA, UNDETERMINED SIGNIFICANCE       COMMENT: CLINICAL CORRELATION IS NECESSARY.      JACMO/JACMO       CLINICAL INFORMATION:   Right thyroid nodule. ICD code 10 E04.1. Right thyroid nodule biopsy.        Patient Active Problem List   Diagnosis    Type II diabetes mellitus, uncontrolled ([facility] [address]    Hypogonadism male    Hypertension    Obesity    Cardiomyopathy due to hypertension, without heart failure (HCC)    BAKER (dyspnea on exertion)    Hypertrophic nonobstructive cardiomyopathy (HCC)    Syncope and collapse    Abnormal EKG    Hypercalcemia    Tremor    Right thyroid nodule    Abnormal ultrasound of thyroid gland     No Known Allergies      Current Outpatient Medications:     furosemide (LASIX) 20 MG tablet, Take 20 mg by mouth daily, Disp: , Rfl:     Insulin Degludec (TRESIBA FLEXTOUCH) 200 UNIT/ML SOPN,  units at bedtime, Disp: 30 pen, Rfl: 03    metFORMIN (GLUCOPHAGE) 1000 MG tablet, TAKE 1 TABLET BY MOUTH TWICE A DAY, Disp: 180 tablet, Rfl: 1    insulin regular (NOVOLIN R) 100 UNIT/ML injection, Inject 30 units tid please give 6 vials for a 90 day supply, Disp: 6 vial, Rfl: 3    blood glucose test strips (ASCENSIA AUTODISC VI;ONE TOUCH ULTRA TEST VI) strip, 1 each by In Vitro route 4 times daily DX: E11.65, IDDM (please dispense One Touch Verio brand), Disp: 400 each, Rfl: 3    Insulin Pen Needle (B-D ULTRAFINE III SHORT PEN) 31G X 8 MM MISC, 3 TIMES A DAY, Disp: 300 each, Rfl: 3    Insulin Syringe-Needle U-100 (INSULIN SYRINGE .3CC/31GX5/16\") 31G X 5/16\" 0.3 ML MISC, Use 3 daily with insulin, Disp: 300 each, Rfl: 3    NIFEdipine (PROCARDIA XL) 90 MG extended release tablet, Take 90 mg by mouth, Disp: , Rfl:     fluticasone (FLONASE) 50 MCG/ACT nasal spray, 1 spray by Nasal route daily, Disp: , Rfl:     tamsulosin (FLOMAX) 0.4 MG capsule, Take 0.4 mg by mouth daily. , Disp: , Rfl:     docusate sodium (COLACE) 100 MG capsule, Take 100 mg by mouth 2 times daily. , Disp: , Rfl:     vitamin D (ERGOCALCIFEROL) 81268 UNITS CAPS capsule, Take 50,000 Units by mouth once a week., Disp: , Rfl:     ACCU-CHEK FASTCLIX LANCETS MISC, Pt test 4x daily, Disp: 400 each, Rfl: 11    lisinopril (PRINIVIL;ZESTRIL) 20 MG tablet, Take 1 tablet by mouth daily. , Disp: 30 tablet, Rfl: 06    gabapentin (NEURONTIN) 100 MG capsule, Take 3 capsules by mouth 3 times daily for 90 days. , Disp: 270 capsule, Rfl: 3    Review of Systems      Vitals:    12/04/20 1100 12/04/20 1101   BP: (!) 151/80 (!) 147/77   Pulse: 55    SpO2: 96%    Weight: (!) 317 lb (143.8 kg)    Height: 5' 11\" (1.803 m)        Objective:   Physical Exam  Vitals signs reviewed. Constitutional:       Appearance: Normal appearance. He is obese. HENT:      Head: Normocephalic and atraumatic. Neck:      Musculoskeletal: Normal range of motion and neck supple. Musculoskeletal:        Feet:    Neurological:      Mental Status: He is alert. Assessment:       Diagnosis Orders   1. Type 2 diabetes mellitus with other specified complication, with long-term current use of insulin ([facility])  POCT Glucose    Basic Metabolic Panel    Hemoglobin A1C    Lipid Panel    Microalbumin / Creatinine Urine Ratio   2.  Right thyroid nodule  US HEAD NECK SOFT TISSUE THYROID    Cortisol Total    Metanephrines Plasma Free    Aldosterone & Renin, Direct With Ratio   3.  Adrenal adenoma, right             Plan:      Orders Placed This Encounter   Procedures    US HEAD NECK SOFT TISSUE THYROID     Standing Status:   Future     Standing Expiration Date:   12/4/2021    Basic Metabolic Panel     Standing Status:   Future     Standing Expiration Date:   12/4/2021    Hemoglobin A1C     Standing Status:   Future     Standing Expiration Date:   12/4/2021    Lipid Panel     Standing Status:   Future     Standing Expiration Date:   12/4/2021     Order Specific Question:   Is Patient Fasting?/# of Hours     Answer:   y    Microalbumin / Creatinine Urine Ratio     Standing Status:   Future     Standing Expiration Date:   12/4/2021    Cortisol Total     Standing Status:   Future     Standing Expiration Date:   12/4/2021     Order Specific Question:   8AM or 4PM?     Answer:   random    Metanephrines Plasma Free     Standing Status:   Future     Standing Expiration Date:   12/4/2021    Aldosterone & Renin, Direct With Ratio     Standing Status:   Future     Standing Expiration Date:   12/4/2021    POCT Glucose     Get lab for aldosterone metanephrine cortisol level continue current medication regimen for diabetes repeat thyroid ultrasound in 3 to 6 months time          Kathlyn Riedel, MD

## 2021-01-18 RX ORDER — GABAPENTIN 100 MG/1
300 CAPSULE ORAL 3 TIMES DAILY
Qty: 270 CAPSULE | Refills: 3 | Status: ON HOLD | OUTPATIENT
Start: 2021-01-18 | End: 2021-05-18

## 2021-02-08 ENCOUNTER — TELEPHONE (OUTPATIENT)
Dept: ENDOCRINOLOGY | Age: 66
End: 2021-02-08

## 2021-02-22 ENCOUNTER — TELEPHONE (OUTPATIENT)
Dept: INTERNAL MEDICINE CLINIC | Age: 66
End: 2021-02-22

## 2021-02-22 NOTE — TELEPHONE ENCOUNTER
Wife called,  she has been unable to reach the office. Patient needs a prescription for diabetic shoes and inserts. Wife spoke to office about this on 2-8-21 but has not received a response. Please fax order to [phone] [facility] @ [facility])    Please call wife, Inderjit Jeremy at [phone] to advise when this has been done.

## 2021-03-15 DIAGNOSIS — E11.69 TYPE 2 DIABETES MELLITUS WITH OTHER SPECIFIED COMPLICATION, WITH LONG-TERM CURRENT USE OF INSULIN (HCC): Primary | ICD-10-CM

## 2021-03-15 DIAGNOSIS — Z79.4 TYPE 2 DIABETES MELLITUS WITH OTHER SPECIFIED COMPLICATION, WITH LONG-TERM CURRENT USE OF INSULIN (HCC): Primary | ICD-10-CM

## 2021-03-18 DIAGNOSIS — E04.1 RIGHT THYROID NODULE: ICD-10-CM

## 2021-03-19 ENCOUNTER — OFFICE VISIT (OUTPATIENT)
Dept: ENDOCRINOLOGY | Age: 66
End: 2021-03-19
Payer: MEDICARE

## 2021-03-19 VITALS
SYSTOLIC BLOOD PRESSURE: 149 MMHG | HEIGHT: 72 IN | WEIGHT: 311 LBS | DIASTOLIC BLOOD PRESSURE: 76 MMHG | BODY MASS INDEX: 42.12 KG/M2 | OXYGEN SATURATION: 97 % | HEART RATE: 60 BPM

## 2021-03-19 DIAGNOSIS — E21.0 HYPERPARATHYROIDISM, PRIMARY (HCC): ICD-10-CM

## 2021-03-19 DIAGNOSIS — E11.69 TYPE 2 DIABETES MELLITUS WITH OTHER SPECIFIED COMPLICATION, WITH LONG-TERM CURRENT USE OF INSULIN (HCC): Primary | ICD-10-CM

## 2021-03-19 DIAGNOSIS — Z79.4 TYPE 2 DIABETES MELLITUS WITH OTHER SPECIFIED COMPLICATION, WITH LONG-TERM CURRENT USE OF INSULIN (HCC): Primary | ICD-10-CM

## 2021-03-19 DIAGNOSIS — E83.52 HYPERCALCEMIA: ICD-10-CM

## 2021-03-19 PROCEDURE — 99213 OFFICE O/P EST LOW 20 MIN: CPT | Performed by: INTERNAL MEDICINE

## 2021-03-19 NOTE — PROGRESS NOTES
left adrenal gland is unremarkable. There is a rounded   mildly and homogeneously hypodense nodule arising from the right adrenal   gland measuring 4 x 3.5 cm, previously 3.5 x 3.2 cm. Kidneys: Nonobstructing calculi are seen in the right kidney measuring up   to 4 mm in size area there is a rounded hypodensity in the upper pole of   the right kidney measuring 1.4 cm in size. Nonobstructing calculi are   also seen in the left kidney measuring up to 7 mm in size. Note is also   made of mild fat stranding surrounding the bilateral renal pelves (3:70   and 80). No overt hydronephrosis is seen. No hydroureter is identified. GI tract: The stomach and small bowel are unremarkable. There is no   evidence of small bowel obstruction or wall thickening. The colon is   unremarkable. Lymph nodes: No abdominal or pelvic lymphadenopathy. Mesentery/Peritoneum: No ascites or mass. Retroperitoneum: No mass. Vasculature: The celiac axis and SMA are patent. The portal vein and   branches, splenic vein, SMV, and hepatic veins are patent. No abdominal   aortic or iliac artery aneurysm. Pelvis: No mass, ascites or fluid collection. Bones/Soft Tissues: Degenerative change is seen in the lumbar spine. No   destructive osseous lesions are seen. Superficial soft tissues are   unremarkable. Lower thorax: Again noted in the right lower lobe is a thick walled air   cyst, now measuring 8 mm in size, previously 7 mm. No lobar   consolidation or pleural effusion is seen. No new pulmonary nodules are   identified.  (topogram) images: No additional findings. IMPRESSION  IMPRESSION:      1.  Multiple bilateral nonobstructing renal calculi. No obstructing   calculus or hydronephrosis is seen. There is mild thickening of the   bilateral renal pelves which may be on the basis of recently passed   calculi versus recent urinary tract infection. Correlate clinically.   2.  Interval increase in size of a right adrenal nodule. This is   incompletely characterized, but likely represents an adenoma. Consider   serum biochemical evaluation to evaluate for a functioning adenoma,   particularly if there are any symptoms which may be attributable to   adrenal hyperfunction. : KAREN    Transcribe Date/Time: Feb 25 2021  9:03A    Dictated by : Apple Bravo MD    This examination was interpreted and the report reviewed and   electronically signed by:   Apple Bravo MD on Feb 25 2021  9:21AM  EST         Type 2 diabetes on Tresiba regular insulin plus Metformin    Lab Results   Component Value Date    LABA1C 7.5 (H) 10/18/2020       History of right adrenal adenoma had metanephrine free from 2018 which was normal adrenal adenoma was 4.1 x 3.6cm unchanged from 2018    Reviewed chemistries calcium 11.5 fluctuating between 10.2-11.6    Results for Danyel Bravo (MRN [id number]) as of 3/19/2021 15:44   Ref.  Range 3/6/2019 15:03 10/18/2020 06:07   PTH Latest Ref Range: 15.0 - 65.0 pg/mL 76.3 (H) 67.0 (H)     Patient Active Problem List   Diagnosis    Type II diabetes mellitus, uncontrolled ([facility] [address]    Hypogonadism male    Hypertension    Obesity    Cardiomyopathy due to hypertension, without heart failure (HCC)    BAKER (dyspnea on exertion)    Hypertrophic nonobstructive cardiomyopathy (HCC)    Syncope and collapse    Abnormal EKG    Hypercalcemia    Tremor    Right thyroid nodule    Abnormal ultrasound of thyroid gland     No Known Allergies      Current Outpatient Medications:     gabapentin (NEURONTIN) 100 MG capsule, TAKE 3 CAPSULES BY MOUTH 3 TIMES DAILY FOR 90 DAYS., Disp: 270 capsule, Rfl: 3    furosemide (LASIX) 20 MG tablet, Take 20 mg by mouth daily, Disp: , Rfl:     Insulin Degludec (TRESIBA FLEXTOUCH) 200 UNIT/ML SOPN,  units at bedtime, Disp: 30 pen, Rfl: 03    metFORMIN (GLUCOPHAGE) 1000 MG tablet, TAKE 1 TABLET BY MOUTH TWICE A DAY, Disp: 180 tablet, Rfl: 1    insulin regular (NOVOLIN R) 100 UNIT/ML injection, Inject 30 units tid please give 6 vials for a 90 day supply, Disp: 6 vial, Rfl: 3    blood glucose test strips (ASCENSIA AUTODISC VI;ONE TOUCH ULTRA TEST VI) strip, 1 each by In Vitro route 4 times daily DX: E11.65, IDDM (please dispense One Touch Verio brand), Disp: 400 each, Rfl: 3    Insulin Pen Needle (B-D ULTRAFINE III SHORT PEN) 31G X 8 MM MISC, 3 TIMES A DAY, Disp: 300 each, Rfl: 3    Insulin Syringe-Needle U-100 (INSULIN SYRINGE .3CC/31GX5/16\") 31G X 5/16\" 0.3 ML MISC, Use 3 daily with insulin, Disp: 300 each, Rfl: 3    NIFEdipine (PROCARDIA XL) 90 MG extended release tablet, Take 90 mg by mouth, Disp: , Rfl:     fluticasone (FLONASE) 50 MCG/ACT nasal spray, 1 spray by Nasal route daily, Disp: , Rfl:     tamsulosin (FLOMAX) 0.4 MG capsule, Take 0.4 mg by mouth daily. , Disp: , Rfl:     docusate sodium (COLACE) 100 MG capsule, Take 100 mg by mouth 2 times daily. , Disp: , Rfl:     vitamin D (ERGOCALCIFEROL) 39699 UNITS CAPS capsule, Take 50,000 Units by mouth once a week., Disp: , Rfl:     ACCU-CHEK FASTCLIX LANCETS MISC, Pt test 4x daily, Disp: 400 each, Rfl: 11    lisinopril (PRINIVIL;ZESTRIL) 20 MG tablet, Take 1 tablet by mouth daily. , Disp: 30 tablet, Rfl: 06      Review of Systems   Genitourinary: Positive for flank pain and hematuria. Musculoskeletal: Positive for gait problem. Vitals:    03/19/21 1533 03/19/21 1535   BP: (!) 159/72 (!) 149/76   Pulse: 60    SpO2: 97%    Weight: (!) 311 lb (141.1 kg)    Height: 6' (1.829 m)        Objective:   Physical Exam  Vitals signs reviewed. Constitutional:       Appearance: Normal appearance. He is obese. HENT:      Head: Normocephalic and atraumatic. Right Ear: External ear normal.      Left Ear: External ear normal.   Neck:      Musculoskeletal: Normal range of motion and neck supple. Cardiovascular:      Rate and Rhythm: Normal rate. Musculoskeletal: Normal range of motion. Neurological:      General: No focal deficit present. Mental Status: He is alert. Psychiatric:         Mood and Affect: Mood normal.         Assessment:       Diagnosis Orders   1. Type 2 diabetes mellitus with other specified complication, with long-term current use of insulin ([facility] [address]     2. Hypercalcemia     3.  Hyperparathyroidism, primary ([address]             Plan:          Continue Tresiba   units at bedtime regular insulin insulin 30 units with each meals plus Metformin    Patient educated about primary hyperparathyroidism need for parathyroidectomy in view of her multiple kidney stones continue current medication for diabetes also to get labs for his adrenal adenoma from prior orders before refer to ENT  Orders Placed This Encounter   Procedures    NM PARATHYROID SCAN     Standing Status:   Future     Standing Expiration Date:   3/19/2022    RICHARDSON - Jamilah Rice MD, Otolaryngology, [facility]     Referral Priority:   Routine     Referral Type:   Eval and Treat     Referral Reason:   Specialty Services Required     Referred to Provider:   Octavio Saab MD     Requested Specialty:   Otolaryngology     Number of Visits Requested:   1     Shelia Rosis MD

## 2021-03-23 ENCOUNTER — TELEPHONE (OUTPATIENT)
Dept: ENDOCRINOLOGY | Age: 66
End: 2021-03-23

## 2021-03-23 NOTE — TELEPHONE ENCOUNTER
Patient is calling about the labs that were ordered. It says that he is supposed to stop his medication before having this drawn. He is not sure what medication to stop or for how long. Please advise.

## 2021-03-29 ENCOUNTER — HOSPITAL ENCOUNTER (OUTPATIENT)
Dept: NUCLEAR MEDICINE | Age: 66
Discharge: HOME | End: 2021-03-31
Payer: MEDICARE

## 2021-03-29 DIAGNOSIS — E83.52 HYPERCALCEMIA: ICD-10-CM

## 2021-03-29 PROCEDURE — 78071 PARATHYRD PLANAR W/WO SUBTRJ: CPT

## 2021-03-29 PROCEDURE — 3430000000 HC RX DIAGNOSTIC RADIOPHARMACEUTICAL: Performed by: INTERNAL MEDICINE

## 2021-03-29 PROCEDURE — A9500 TC99M SESTAMIBI: HCPCS | Performed by: INTERNAL MEDICINE

## 2021-03-29 RX ADMIN — TETRAKIS(2-METHOXYISOBUTYLISOCYANIDE)COPPER(I) TETRAFLUOROBORATE 27.1 MILLICURIE: 1 INJECTION, POWDER, LYOPHILIZED, FOR SOLUTION INTRAVENOUS at 09:43

## 2021-04-06 DIAGNOSIS — Z79.4 TYPE 2 DIABETES MELLITUS WITH OTHER SPECIFIED COMPLICATION, WITH LONG-TERM CURRENT USE OF INSULIN (HCC): ICD-10-CM

## 2021-04-06 DIAGNOSIS — E11.69 TYPE 2 DIABETES MELLITUS WITH OTHER SPECIFIED COMPLICATION, WITH LONG-TERM CURRENT USE OF INSULIN (HCC): ICD-10-CM

## 2021-04-06 DIAGNOSIS — E03.9 HYPOTHYROIDISM, UNSPECIFIED TYPE: ICD-10-CM

## 2021-04-06 DIAGNOSIS — D35.01 ADENOMA OF RIGHT ADRENAL GLAND: ICD-10-CM

## 2021-04-06 LAB
ANION GAP SERPL CALCULATED.3IONS-SCNC: 11 MEQ/L (ref 9–15)
BUN BLDV-MCNC: 33 MG/DL (ref 8–23)
CALCIUM SERPL-MCNC: 12.9 MG/DL (ref 8.5–9.9)
CHLORIDE BLD-SCNC: 101 MEQ/L (ref 95–107)
CHOLESTEROL, TOTAL: 90 MG/DL (ref 0–199)
CO2: 25 MEQ/L (ref 20–31)
CORTISOL TOTAL: 12.2 UG/DL
CREAT SERPL-MCNC: 1.16 MG/DL (ref 0.7–1.2)
CREATININE URINE: 104.5 MG/DL
GFR AFRICAN AMERICAN: >60
GFR NON-AFRICAN AMERICAN: >60
GLUCOSE BLD-MCNC: 134 MG/DL (ref 70–99)
HBA1C MFR BLD: 6.9 % (ref 4.8–5.9)
HDLC SERPL-MCNC: 35 MG/DL (ref 40–59)
LDL CHOLESTEROL CALCULATED: 36 MG/DL (ref 0–129)
MICROALBUMIN UR-MCNC: 1.7 MG/DL
MICROALBUMIN/CREAT UR-RTO: 16.3 MG/G (ref 0–30)
POTASSIUM SERPL-SCNC: 4.7 MEQ/L (ref 3.4–4.9)
SODIUM BLD-SCNC: 137 MEQ/L (ref 135–144)
T4 FREE: 1.41 NG/DL (ref 0.84–1.68)
TRIGL SERPL-MCNC: 96 MG/DL (ref 0–150)
TSH SERPL DL<=0.05 MIU/L-ACNC: 2.35 UIU/ML (ref 0.44–3.86)

## 2021-04-06 RX ORDER — INSULIN DEGLUDEC 200 U/ML
INJECTION, SOLUTION SUBCUTANEOUS
Qty: 45 PEN | Refills: 3 | Status: SHIPPED | OUTPATIENT
Start: 2021-04-06

## 2021-04-08 LAB
METANEPH/PLASMA INTERP: NORMAL
METANEPHRINE FREE PLASMA: <0.1 NMOL/L (ref 0–0.49)
NORMETANEPHRINE FREE PLASMA: 0.44 NMOL/L (ref 0–0.89)

## 2021-04-13 LAB
ALDOSTERONE/DIRECT RENIN CALCULATION: 0 RATIO (ref 0.1–3.7)
ALDOSTERONE: 3.6 NG/DL
RENIN PLASMA: 676 PG/ML (ref 2.5–45.7)

## 2021-04-16 ENCOUNTER — OFFICE VISIT (OUTPATIENT)
Dept: ENDOCRINOLOGY | Age: 66
End: 2021-04-16
Payer: MEDICARE

## 2021-04-16 VITALS
HEART RATE: 58 BPM | WEIGHT: 315 LBS | DIASTOLIC BLOOD PRESSURE: 73 MMHG | BODY MASS INDEX: 42.66 KG/M2 | HEIGHT: 72 IN | OXYGEN SATURATION: 98 % | SYSTOLIC BLOOD PRESSURE: 130 MMHG

## 2021-04-16 DIAGNOSIS — Z79.4 TYPE 2 DIABETES MELLITUS WITH OTHER SPECIFIED COMPLICATION, WITH LONG-TERM CURRENT USE OF INSULIN (HCC): Primary | ICD-10-CM

## 2021-04-16 DIAGNOSIS — E83.52 HYPERCALCEMIA: ICD-10-CM

## 2021-04-16 DIAGNOSIS — D35.01 ADRENAL ADENOMA, RIGHT: ICD-10-CM

## 2021-04-16 DIAGNOSIS — E21.0 HYPERPARATHYROIDISM, PRIMARY (HCC): ICD-10-CM

## 2021-04-16 DIAGNOSIS — E11.69 TYPE 2 DIABETES MELLITUS WITH OTHER SPECIFIED COMPLICATION, WITH LONG-TERM CURRENT USE OF INSULIN (HCC): Primary | ICD-10-CM

## 2021-04-16 LAB
CHP ED QC CHECK: NORMAL
GLUCOSE BLD-MCNC: 131 MG/DL

## 2021-04-16 PROCEDURE — 82962 GLUCOSE BLOOD TEST: CPT | Performed by: INTERNAL MEDICINE

## 2021-04-16 PROCEDURE — 99214 OFFICE O/P EST MOD 30 MIN: CPT | Performed by: INTERNAL MEDICINE

## 2021-04-16 NOTE — PROGRESS NOTES
4/16/2021    Assessment:       Diagnosis Orders   1. Type 2 diabetes mellitus with other specified complication, with long-term current use of insulin ([facility])  POCT Glucose    Basic Metabolic Panel    Hemoglobin A1C    Microalbumin / Creatinine Urine Ratio   2. Hypercalcemia     3. Hyperparathyroidism, primary ([address]     4.  Adrenal adenoma, right           PLAN:     Orders Placed This Encounter   Procedures    Basic Metabolic Panel     Standing Status:   Future     Standing Expiration Date:   4/16/2022    Hemoglobin A1C     Standing Status:   Future     Standing Expiration Date:   4/16/2022    Microalbumin / Creatinine Urine Ratio     Standing Status:   Future     Standing Expiration Date:   4/16/2022    POCT Glucose     Continue Tresiba 90  units at bedtime plus regular insulin 30 units with each meals       Continue current medication regimen patient cleared from endocrine for parathyroid surgery with moderate risk  More than 50% of 30-minute spent patient education counseling      Subjective:     Chief Complaint   Patient presents with    Diabetes     Vitals:    04/16/21 1609   BP: 130/73   Pulse: 58   SpO2: 98%   Weight: (!) 319 lb (144.7 kg)   Height: 6' (1.829 m)     Wt Readings from Last 3 Encounters:   04/16/21 (!) 319 lb (144.7 kg)   03/19/21 (!) 311 lb (141.1 kg)   12/04/20 (!) 317 lb (143.8 kg)     BP Readings from Last 3 Encounters:   04/16/21 130/73   03/19/21 (!) 149/76   12/04/20 (!) 147/77     Patient here for follow-up on hypercalcemia hyperparathyroidism and multiple kidney stones history also of thyroid nodule and right adrenal adenoma had labs done recently reviewed metanephrines reviewed aldosterone thyroid normal range    Patient is seeing ENT Dr. Jigar Alvarez and is scheduled to see ENT specialist at [facility] for parathyroidectomy parathyroid scan was nonlocalizing    Type 2 diabetes with stable to improved control    Patient also complains of tremors of bilateral hands at this time does not want any work-up    Diabetes  He presents for his follow-up diabetic visit. He has type 2 diabetes mellitus. His disease course has been stable. Hypoglycemia symptoms include dizziness. Risk factors for coronary artery disease include obesity. Current diabetic treatment includes insulin injections. He is currently taking insulin pre-breakfast, pre-lunch, pre-dinner and at bedtime. His overall blood glucose range is 130-140 mg/dl.  (Lab Results       Component                Value               Date                       LABA1C                   6.9 (H)             2021            )     Past Medical History:   Diagnosis Date    Hypertension     Hypogonadism male     Type II or unspecified type diabetes mellitus without mention of complication, not stated as uncontrolled      Past Surgical History:   Procedure Laterality Date    US THYROID BIOPSY  2020     THYROID BIOPSY 2020 MLOX RAD VASC INTERVNL     Social History     Socioeconomic History    Marital status:      Spouse name: Not on file    Number of children: Not on file    Years of education: Not on file    Highest education level: Not on file   Occupational History    Not on file   Social Needs    Financial resource strain: Not on file    Food insecurity     Worry: Not on file     Inability: Not on file    Transportation needs     Medical: Not on file     Non-medical: Not on file   Tobacco Use    Smoking status: Former Smoker     Types: Cigarettes     Quit date: 1975     Years since quittin.3    Smokeless tobacco: Never Used   Substance and Sexual Activity    Alcohol use: Not on file    Drug use: Not on file    Sexual activity: Not on file   Lifestyle    Physical activity     Days per week: Not on file     Minutes per session: Not on file    Stress: Not on file   Relationships    Social connections     Talks on phone: Not on file     Gets together: Not on file     Attends Gnosticism service: Not on file Active member of club or organization: Not on file     Attends meetings of clubs or organizations: Not on file     Relationship status: Not on file    Intimate partner violence     Fear of current or ex partner: Not on file     Emotionally abused: Not on file     Physically abused: Not on file     Forced sexual activity: Not on file   Other Topics Concern    Not on file   Social History Narrative    Not on file     No family history on file. No Known Allergies    Current Outpatient Medications:     insulin regular (NOVOLIN R) 100 UNIT/ML injection, INJECT 30 UNITS 3 TIMES DAILY, Disp: 60 mL, Rfl: 3    Insulin Degludec (TRESIBA FLEXTOUCH) 200 UNIT/ML SOPN, INJECT  UNITS AT BEDTIME, Disp: 45 pen, Rfl: 3    gabapentin (NEURONTIN) 100 MG capsule, TAKE 3 CAPSULES BY MOUTH 3 TIMES DAILY FOR 90 DAYS., Disp: 270 capsule, Rfl: 3    furosemide (LASIX) 20 MG tablet, Take 20 mg by mouth daily, Disp: , Rfl:     metFORMIN (GLUCOPHAGE) 1000 MG tablet, TAKE 1 TABLET BY MOUTH TWICE A DAY, Disp: 180 tablet, Rfl: 1    blood glucose test strips (ASCENSIA AUTODISC VI;ONE TOUCH ULTRA TEST VI) strip, 1 each by In Vitro route 4 times daily DX: E11.65, IDDM (please dispense One Touch Verio brand), Disp: 400 each, Rfl: 3    Insulin Pen Needle (B-D ULTRAFINE III SHORT PEN) 31G X 8 MM MISC, 3 TIMES A DAY, Disp: 300 each, Rfl: 3    Insulin Syringe-Needle U-100 (INSULIN SYRINGE .3CC/31GX5/16\") 31G X 5/16\" 0.3 ML MISC, Use 3 daily with insulin, Disp: 300 each, Rfl: 3    NIFEdipine (PROCARDIA XL) 90 MG extended release tablet, Take 90 mg by mouth, Disp: , Rfl:     fluticasone (FLONASE) 50 MCG/ACT nasal spray, 1 spray by Nasal route daily, Disp: , Rfl:     tamsulosin (FLOMAX) 0.4 MG capsule, Take 0.4 mg by mouth daily. , Disp: , Rfl:     docusate sodium (COLACE) 100 MG capsule, Take 100 mg by mouth 2 times daily. , Disp: , Rfl:     vitamin D (ERGOCALCIFEROL) 58056 UNITS CAPS capsule, Take 50,000 Units by mouth once a week. , Disp: , Rfl:     ACCU-CHEK FASTCLIX LANCETS MISC, Pt test 4x daily, Disp: 400 each, Rfl: 11    lisinopril (PRINIVIL;ZESTRIL) 20 MG tablet, Take 1 tablet by mouth daily. , Disp: 30 tablet, Rfl: 06     EXAMINATION: NM PARATHYROID W SPECT       DATE AND TIME:3/29/2021 9:34 AM       CLINICAL HISTORY: E83.52 Hypercalcemia ICD10        COMPARISON: None       Technique: Parathyroid imaging was performed after the administration of 33.5 millicuries of technetium 80 M. sestamibi. Planar images were obtained 15 minutes postinjection 4 hours postinjection complemented by additional SPECT images in the axial    transvaginal entrance coronal planes.       Findings: The initial early images show prompt uptake in the salivary glands. There is uptake in the thyroid bed predominantly in the lower pole the right thyroid lobe corresponding to a right thyroid nodule. This was previously biopsied and showed    atypia of undetermined significance that can reflect degenerating hyperplastic or adenomatous nodules. . On the delayed images there is washout of the radiotracer in both thyroid lobes are relatively symmetric fashion with no definite residual activity. Parathyroid imaging study show no signs of a focal parathyroid adenoma.               Impression   Impression: Parathyroid imaging shows increased uptake in the right thyroid nodule. No signs of a focal parathyroid adenoma is identified. Results for [employer] Call (MRN [id number]) as of 4/16/2021 16:20   Ref. Range 7/23/2018 09:00 10/17/2020 18:45 10/18/2020 06:07 4/6/2021 11:57   Calcium Latest Ref Range: 8.5 - 9.9 mg/dL 11.0 (H) 10.9 (H) 10.0 (H) 12.9 (H)     Results for [employer] Call (MRN [id number]) as of 4/16/2021 16:20   Ref.  Range 3/6/2019 15:03 10/18/2020 06:07 4/7/2021 10:42   PTH Latest Ref Range: 15.0 - 65.0 pg/mL 76.3 (H) 67.0 (H) 71.9 (H)       Lab Results   Component Value Date     04/06/2021    K 4.7 04/06/2021     04/06/2021    CO2 25 04/06/2021 BUN 33 (H) 04/06/2021    CREATININE 1.16 04/06/2021    GLUCOSE 131 04/16/2021    CALCIUM 12.9 (H) 04/06/2021    PROT 6.5 10/18/2020    LABALBU 3.5 10/18/2020    BILITOT <0.2 10/18/2020    ALKPHOS 65 10/18/2020    AST 13 10/18/2020    ALT 14 10/18/2020    LABGLOM >60.0 04/06/2021    GFRAA >60.0 04/06/2021    GLOB 3.0 10/18/2020     Lab Results   Component Value Date    WBC 11.2 (H) 04/07/2021    HGB 12.1 (L) 04/07/2021    HCT 36.0 (L) 04/07/2021    MCV 90.9 04/07/2021     04/07/2021     Lab Results   Component Value Date    LABA1C 6.9 (H) 04/06/2021    LABA1C 7.5 (H) 10/18/2020    LABA1C 8.3 09/26/2019     Lab Results   Component Value Date    HDL 35 (L) 04/06/2021    HDL 35 (L) 04/16/2013    HDL 33 (L) 02/16/2012    LDLCALC 36 04/06/2021    LDLCALC 101 04/16/2013    LDLCALC 89 02/16/2012    CHOL 90 04/06/2021    CHOL 170 04/16/2013    CHOL 136 02/16/2012    TRIG 96 04/06/2021    TRIG 172 (H) 04/16/2013    TRIG 90 02/16/2012     Results for [facility] (MRN [id number]) as of 4/16/2021 16:20   Ref. Range 7/23/2018 15:32 3/7/2019 16:10 9/26/2019 15:24 10/18/2020 10:47 4/6/2021 11:57   Hemoglobin A1C Latest Ref Range: 4.8 - 5.9 % 6.3 7.8 8.3 7.5 (H) 6.9 (H)     Review of Systems   Respiratory: Negative. Cardiovascular: Negative. Endocrine: Negative. Genitourinary:        History of multiple kidney stones   Neurological: Positive for dizziness. Tremors bilateral hands   All other systems reviewed and are negative. Vitals:    04/16/21 1609   BP: 130/73   Pulse: 58   SpO2: 98%   Weight: (!) 319 lb (144.7 kg)   Height: 6' (1.829 m)       Objective:   Physical Exam  Vitals signs reviewed. Constitutional:       Appearance: Normal appearance. He is obese. HENT:      Head: Normocephalic and atraumatic. Right Ear: External ear normal.      Left Ear: External ear normal.      Nose: Nose normal.   Eyes:      General: No scleral icterus. Right eye: No discharge.          Left eye: No discharge. Extraocular Movements: Extraocular movements intact. Neck:      Musculoskeletal: Normal range of motion and neck supple. Cardiovascular:      Rate and Rhythm: Bradycardia present. Musculoskeletal: Normal range of motion. Right lower leg: Edema present. Left lower leg: Edema present. Skin:     General: Skin is warm. Neurological:      General: No focal deficit present. Mental Status: He is alert and oriented to person, place, and time.       Comments: Tremors    Psychiatric:         Mood and Affect: Mood normal.         Behavior: Behavior normal.

## 2021-04-18 ASSESSMENT — ENCOUNTER SYMPTOMS: RESPIRATORY NEGATIVE: 1

## 2021-05-18 ENCOUNTER — APPOINTMENT (OUTPATIENT)
Dept: GENERAL RADIOLOGY | Age: 66
DRG: 682 | End: 2021-05-18
Payer: MEDICARE

## 2021-05-18 ENCOUNTER — HOSPITAL ENCOUNTER (INPATIENT)
Age: 66
LOS: 1 days | Discharge: HOME | DRG: 682 | End: 2021-05-19
Attending: EMERGENCY MEDICINE | Admitting: INTERNAL MEDICINE
Payer: MEDICARE

## 2021-05-18 ENCOUNTER — APPOINTMENT (OUTPATIENT)
Dept: ULTRASOUND IMAGING | Age: 66
DRG: 682 | End: 2021-05-18
Payer: MEDICARE

## 2021-05-18 ENCOUNTER — APPOINTMENT (OUTPATIENT)
Dept: CT IMAGING | Age: 66
DRG: 682 | End: 2021-05-18
Payer: MEDICARE

## 2021-05-18 DIAGNOSIS — E86.0 DEHYDRATION: ICD-10-CM

## 2021-05-18 DIAGNOSIS — I10 ESSENTIAL HYPERTENSION: ICD-10-CM

## 2021-05-18 DIAGNOSIS — N30.00 ACUTE CYSTITIS WITHOUT HEMATURIA: ICD-10-CM

## 2021-05-18 DIAGNOSIS — E66.01 MORBID OBESITY WITH BMI OF 40.0-44.9, ADULT (HCC): ICD-10-CM

## 2021-05-18 DIAGNOSIS — I21.4 NSTEMI (NON-ST ELEVATED MYOCARDIAL INFARCTION) (HCC): Primary | ICD-10-CM

## 2021-05-18 PROBLEM — I20.8 ANGINA AT REST (HCC): Status: ACTIVE | Noted: 2021-05-18

## 2021-05-18 PROBLEM — I20.89 ANGINA AT REST: Status: ACTIVE | Noted: 2021-05-18

## 2021-05-18 LAB
ALBUMIN SERPL-MCNC: 4.2 G/DL (ref 3.5–4.6)
ALP BLD-CCNC: 87 U/L (ref 35–104)
ALT SERPL-CCNC: 13 U/L (ref 0–41)
ANION GAP SERPL CALCULATED.3IONS-SCNC: 10 MEQ/L (ref 9–15)
APTT: 28 SEC (ref 24.4–36.8)
APTT: 29.4 SEC (ref 24.4–36.8)
AST SERPL-CCNC: 15 U/L (ref 0–40)
BACTERIA: ABNORMAL /HPF
BASE EXCESS VENOUS: 0 (ref -3–3)
BASOPHILS ABSOLUTE: 0.1 K/UL (ref 0–0.2)
BASOPHILS RELATIVE PERCENT: 0.5 %
BETA-HYDROXYBUTYRATE: 2 MG/DL (ref 0.2–2.8)
BILIRUB SERPL-MCNC: 0.4 MG/DL (ref 0.2–0.7)
BILIRUBIN URINE: NEGATIVE
BLOOD, URINE: NEGATIVE
BUN BLDV-MCNC: 29 MG/DL (ref 8–23)
CALCIUM IONIZED: 1.25 MMOL/L (ref 1.12–1.32)
CALCIUM SERPL-MCNC: 11.2 MG/DL (ref 8.5–9.9)
CHLORIDE BLD-SCNC: 101 MEQ/L (ref 95–107)
CLARITY: CLEAR
CO2: 24 MEQ/L (ref 20–31)
COLOR: YELLOW
CREAT SERPL-MCNC: 1.24 MG/DL (ref 0.7–1.2)
D DIMER: 0.88 MG/L FEU (ref 0–0.5)
EKG ATRIAL RATE: 113 BPM
EKG Q-T INTERVAL: 384 MS
EKG QRS DURATION: 142 MS
EKG QTC CALCULATION (BAZETT): 526 MS
EKG R AXIS: -71 DEGREES
EKG T AXIS: 47 DEGREES
EKG VENTRICULAR RATE: 113 BPM
EOSINOPHILS ABSOLUTE: 0.1 K/UL (ref 0–0.7)
EOSINOPHILS RELATIVE PERCENT: 1.2 %
EPITHELIAL CELLS, UA: ABNORMAL /HPF (ref 0–5)
GFR AFRICAN AMERICAN: >60
GFR AFRICAN AMERICAN: >60
GFR NON-AFRICAN AMERICAN: 58.1
GFR NON-AFRICAN AMERICAN: >60
GLOBULIN: 3.1 G/DL (ref 2.3–3.5)
GLUCOSE BLD-MCNC: 130 MG/DL (ref 60–115)
GLUCOSE BLD-MCNC: 135 MG/DL (ref 60–115)
GLUCOSE BLD-MCNC: 162 MG/DL (ref 60–115)
GLUCOSE BLD-MCNC: 78 MG/DL (ref 60–115)
GLUCOSE BLD-MCNC: 82 MG/DL (ref 70–99)
GLUCOSE URINE: NEGATIVE MG/DL
HBA1C MFR BLD: 6.6 % (ref 4.8–5.9)
HCO3 VENOUS: 24.3 MMOL/L (ref 23–29)
HCT VFR BLD CALC: 36.2 % (ref 42–52)
HCT VFR BLD CALC: 39.8 % (ref 42–52)
HEMOGLOBIN: 12.1 G/DL (ref 14–18)
HEMOGLOBIN: 13.3 G/DL (ref 14–18)
HEMOGLOBIN: 15.3 GM/DL (ref 13.5–17.5)
HYALINE CASTS: ABNORMAL /HPF (ref 0–5)
INR BLD: 1.1
KETONES, URINE: NEGATIVE MG/DL
LACTATE: 2.66 MMOL/L (ref 0.4–2)
LACTIC ACID, SEPSIS: 1.1 MMOL/L (ref 0.5–1.9)
LACTIC ACID, SEPSIS: 1.1 MMOL/L (ref 0.5–1.9)
LEUKOCYTE ESTERASE, URINE: ABNORMAL
LIPASE: 14 U/L (ref 12–95)
LIPASE: 21 U/L (ref 12–95)
LV EF: 53 %
LVEF MODALITY: NORMAL
LYMPHOCYTES ABSOLUTE: 0.6 K/UL (ref 1–4.8)
LYMPHOCYTES RELATIVE PERCENT: 5.4 %
MAGNESIUM: 1.7 MG/DL (ref 1.7–2.4)
MAGNESIUM: 1.8 MG/DL (ref 1.7–2.4)
MCH RBC QN AUTO: 29.3 PG (ref 27–31.3)
MCH RBC QN AUTO: 29.9 PG (ref 27–31.3)
MCHC RBC AUTO-ENTMCNC: 33.5 % (ref 33–37)
MCHC RBC AUTO-ENTMCNC: 33.6 % (ref 33–37)
MCV RBC AUTO: 87.6 FL (ref 80–100)
MCV RBC AUTO: 89.2 FL (ref 80–100)
MONOCYTES ABSOLUTE: 1 K/UL (ref 0.2–0.8)
MONOCYTES RELATIVE PERCENT: 8.7 %
NEUTROPHILS ABSOLUTE: 10 K/UL (ref 1.4–6.5)
NEUTROPHILS RELATIVE PERCENT: 84.2 %
NITRITE, URINE: NEGATIVE
O2 SAT, VEN: 89 %
PCO2, VEN: 36.6 MM HG (ref 40–50)
PDW BLD-RTO: 13.8 % (ref 11.5–14.5)
PDW BLD-RTO: 14 % (ref 11.5–14.5)
PERFORMED ON: ABNORMAL
PH UA: 7.5 (ref 5–9)
PH VENOUS: 7.43 (ref 7.35–7.45)
PLATELET # BLD: 216 K/UL (ref 130–400)
PLATELET # BLD: 229 K/UL (ref 130–400)
PO2, VEN: 54 MM HG
POC CHLORIDE: 105 MEQ/L (ref 99–110)
POC CREATININE: 1.2 MG/DL (ref 0.8–1.3)
POC HEMATOCRIT: 45 % (ref 41–53)
POC POTASSIUM: 3.6 MEQ/L (ref 3.5–5.1)
POC SAMPLE TYPE: ABNORMAL
POC SODIUM: 136 MEQ/L (ref 136–145)
POTASSIUM REFLEX MAGNESIUM: 3.9 MEQ/L (ref 3.4–4.9)
PRO-BNP: 297 PG/ML
PROTEIN UA: ABNORMAL MG/DL
PROTHROMBIN TIME: 14.4 SEC (ref 12.3–14.9)
RBC # BLD: 4.06 M/UL (ref 4.7–6.1)
RBC # BLD: 4.54 M/UL (ref 4.7–6.1)
SARS-COV-2, NAAT: NOT DETECTED
SODIUM BLD-SCNC: 135 MEQ/L (ref 135–144)
SPECIFIC GRAVITY UA: 1.01 (ref 1–1.03)
TCO2 CALC VENOUS: 25 MMOL/L
TOTAL PROTEIN: 7.3 G/DL (ref 6.3–8)
TROPONIN: 0.02 NG/ML (ref 0–0.01)
UROBILINOGEN, URINE: 1 E.U./DL
WBC # BLD: 11.9 K/UL (ref 4.8–10.8)
WBC # BLD: 12.6 K/UL (ref 4.8–10.8)
WBC UA: ABNORMAL /HPF (ref 0–5)

## 2021-05-18 PROCEDURE — 83735 ASSAY OF MAGNESIUM: CPT

## 2021-05-18 PROCEDURE — 99285 EMERGENCY DEPT VISIT HI MDM: CPT

## 2021-05-18 PROCEDURE — 83605 ASSAY OF LACTIC ACID: CPT

## 2021-05-18 PROCEDURE — 93010 ELECTROCARDIOGRAM REPORT: CPT | Performed by: INTERNAL MEDICINE

## 2021-05-18 PROCEDURE — 87635 SARS-COV-2 COVID-19 AMP PRB: CPT

## 2021-05-18 PROCEDURE — 87040 BLOOD CULTURE FOR BACTERIA: CPT

## 2021-05-18 PROCEDURE — 2580000003 HC RX 258: Performed by: INTERNAL MEDICINE

## 2021-05-18 PROCEDURE — 85025 COMPLETE CBC W/AUTO DIFF WBC: CPT

## 2021-05-18 PROCEDURE — 2580000003 HC RX 258: Performed by: STUDENT IN AN ORGANIZED HEALTH CARE EDUCATION/TRAINING PROGRAM

## 2021-05-18 PROCEDURE — 71045 X-RAY EXAM CHEST 1 VIEW: CPT

## 2021-05-18 PROCEDURE — APPSS60 APP SPLIT SHARED TIME 46-60 MINUTES: Performed by: PHYSICIAN ASSISTANT

## 2021-05-18 PROCEDURE — 83036 HEMOGLOBIN GLYCOSYLATED A1C: CPT

## 2021-05-18 PROCEDURE — 82330 ASSAY OF CALCIUM: CPT

## 2021-05-18 PROCEDURE — 96375 TX/PRO/DX INJ NEW DRUG ADDON: CPT

## 2021-05-18 PROCEDURE — 99213 OFFICE O/P EST LOW 20 MIN: CPT

## 2021-05-18 PROCEDURE — 99223 1ST HOSP IP/OBS HIGH 75: CPT | Performed by: INTERNAL MEDICINE

## 2021-05-18 PROCEDURE — 6360000002 HC RX W HCPCS: Performed by: EMERGENCY MEDICINE

## 2021-05-18 PROCEDURE — 85027 COMPLETE CBC AUTOMATED: CPT

## 2021-05-18 PROCEDURE — 84484 ASSAY OF TROPONIN QUANT: CPT

## 2021-05-18 PROCEDURE — 83880 ASSAY OF NATRIURETIC PEPTIDE: CPT

## 2021-05-18 PROCEDURE — 93306 TTE W/DOPPLER COMPLETE: CPT

## 2021-05-18 PROCEDURE — 96365 THER/PROPH/DIAG IV INF INIT: CPT

## 2021-05-18 PROCEDURE — 6370000000 HC RX 637 (ALT 250 FOR IP): Performed by: EMERGENCY MEDICINE

## 2021-05-18 PROCEDURE — 6370000000 HC RX 637 (ALT 250 FOR IP): Performed by: NURSE PRACTITIONER

## 2021-05-18 PROCEDURE — 6360000004 HC RX CONTRAST MEDICATION: Performed by: EMERGENCY MEDICINE

## 2021-05-18 PROCEDURE — 2060000000 HC ICU INTERMEDIATE R&B

## 2021-05-18 PROCEDURE — 36415 COLL VENOUS BLD VENIPUNCTURE: CPT

## 2021-05-18 PROCEDURE — 81001 URINALYSIS AUTO W/SCOPE: CPT

## 2021-05-18 PROCEDURE — 83690 ASSAY OF LIPASE: CPT

## 2021-05-18 PROCEDURE — 82435 ASSAY OF BLOOD CHLORIDE: CPT

## 2021-05-18 PROCEDURE — 84295 ASSAY OF SERUM SODIUM: CPT

## 2021-05-18 PROCEDURE — 85379 FIBRIN DEGRADATION QUANT: CPT

## 2021-05-18 PROCEDURE — 85014 HEMATOCRIT: CPT

## 2021-05-18 PROCEDURE — 71275 CT ANGIOGRAPHY CHEST: CPT

## 2021-05-18 PROCEDURE — 85730 THROMBOPLASTIN TIME PARTIAL: CPT

## 2021-05-18 PROCEDURE — 82803 BLOOD GASES ANY COMBINATION: CPT

## 2021-05-18 PROCEDURE — 80053 COMPREHEN METABOLIC PANEL: CPT

## 2021-05-18 PROCEDURE — 82010 KETONE BODYS QUAN: CPT

## 2021-05-18 PROCEDURE — 93970 EXTREMITY STUDY: CPT

## 2021-05-18 PROCEDURE — 6360000002 HC RX W HCPCS: Performed by: STUDENT IN AN ORGANIZED HEALTH CARE EDUCATION/TRAINING PROGRAM

## 2021-05-18 PROCEDURE — 93005 ELECTROCARDIOGRAM TRACING: CPT | Performed by: EMERGENCY MEDICINE

## 2021-05-18 PROCEDURE — 82565 ASSAY OF CREATININE: CPT

## 2021-05-18 PROCEDURE — 96367 TX/PROPH/DG ADDL SEQ IV INF: CPT

## 2021-05-18 PROCEDURE — 6370000000 HC RX 637 (ALT 250 FOR IP): Performed by: PHYSICIAN ASSISTANT

## 2021-05-18 PROCEDURE — 36600 WITHDRAWAL OF ARTERIAL BLOOD: CPT

## 2021-05-18 PROCEDURE — 74177 CT ABD & PELVIS W/CONTRAST: CPT

## 2021-05-18 PROCEDURE — 84132 ASSAY OF SERUM POTASSIUM: CPT

## 2021-05-18 PROCEDURE — 85610 PROTHROMBIN TIME: CPT

## 2021-05-18 PROCEDURE — 6370000000 HC RX 637 (ALT 250 FOR IP): Performed by: INTERNAL MEDICINE

## 2021-05-18 RX ORDER — TAMSULOSIN HYDROCHLORIDE 0.4 MG/1
0.4 CAPSULE ORAL
Status: DISCONTINUED | OUTPATIENT
Start: 2021-05-18 | End: 2021-05-19 | Stop reason: HOSPADM

## 2021-05-18 RX ORDER — POLYETHYLENE GLYCOL 3350 17 G/17G
17 POWDER, FOR SOLUTION ORAL DAILY PRN
Status: DISCONTINUED | OUTPATIENT
Start: 2021-05-18 | End: 2021-05-19 | Stop reason: HOSPADM

## 2021-05-18 RX ORDER — ACETAMINOPHEN 325 MG/1
650 TABLET ORAL EVERY 6 HOURS PRN
Status: DISCONTINUED | OUTPATIENT
Start: 2021-05-18 | End: 2021-05-19 | Stop reason: HOSPADM

## 2021-05-18 RX ORDER — ONDANSETRON 2 MG/ML
4 INJECTION INTRAMUSCULAR; INTRAVENOUS EVERY 6 HOURS PRN
Status: DISCONTINUED | OUTPATIENT
Start: 2021-05-18 | End: 2021-05-19 | Stop reason: HOSPADM

## 2021-05-18 RX ORDER — HEPARIN SODIUM 10000 [USP'U]/100ML
5-30 INJECTION, SOLUTION INTRAVENOUS CONTINUOUS
Status: DISCONTINUED | OUTPATIENT
Start: 2021-05-18 | End: 2021-05-18

## 2021-05-18 RX ORDER — SODIUM CHLORIDE 9 MG/ML
25 INJECTION, SOLUTION INTRAVENOUS PRN
Status: DISCONTINUED | OUTPATIENT
Start: 2021-05-18 | End: 2021-05-19 | Stop reason: HOSPADM

## 2021-05-18 RX ORDER — 0.9 % SODIUM CHLORIDE 0.9 %
1000 INTRAVENOUS SOLUTION INTRAVENOUS ONCE
Status: COMPLETED | OUTPATIENT
Start: 2021-05-18 | End: 2021-05-18

## 2021-05-18 RX ORDER — NICOTINE POLACRILEX 4 MG
15 LOZENGE BUCCAL PRN
Status: DISCONTINUED | OUTPATIENT
Start: 2021-05-18 | End: 2021-05-19 | Stop reason: HOSPADM

## 2021-05-18 RX ORDER — GABAPENTIN 100 MG/1
200 CAPSULE ORAL 2 TIMES DAILY
Status: DISCONTINUED | OUTPATIENT
Start: 2021-05-18 | End: 2021-05-19 | Stop reason: HOSPADM

## 2021-05-18 RX ORDER — AMLODIPINE BESYLATE 10 MG/1
10 TABLET ORAL DAILY
Status: DISCONTINUED | OUTPATIENT
Start: 2021-05-18 | End: 2021-05-19 | Stop reason: HOSPADM

## 2021-05-18 RX ORDER — FLUTICASONE PROPIONATE 50 MCG
1 SPRAY, SUSPENSION (ML) NASAL NIGHTLY
Status: DISCONTINUED | OUTPATIENT
Start: 2021-05-18 | End: 2021-05-19 | Stop reason: HOSPADM

## 2021-05-18 RX ORDER — MORPHINE SULFATE 2 MG/ML
4 INJECTION, SOLUTION INTRAMUSCULAR; INTRAVENOUS ONCE
Status: COMPLETED | OUTPATIENT
Start: 2021-05-18 | End: 2021-05-18

## 2021-05-18 RX ORDER — DEXTROSE MONOHYDRATE 50 MG/ML
100 INJECTION, SOLUTION INTRAVENOUS PRN
Status: DISCONTINUED | OUTPATIENT
Start: 2021-05-18 | End: 2021-05-19 | Stop reason: HOSPADM

## 2021-05-18 RX ORDER — FUROSEMIDE 20 MG/1
20 TABLET ORAL 2 TIMES DAILY
Status: DISCONTINUED | OUTPATIENT
Start: 2021-05-18 | End: 2021-05-19 | Stop reason: HOSPADM

## 2021-05-18 RX ORDER — CIPROFLOXACIN 2 MG/ML
400 INJECTION, SOLUTION INTRAVENOUS ONCE
Status: COMPLETED | OUTPATIENT
Start: 2021-05-18 | End: 2021-05-18

## 2021-05-18 RX ORDER — ONDANSETRON 2 MG/ML
4 INJECTION INTRAMUSCULAR; INTRAVENOUS ONCE
Status: COMPLETED | OUTPATIENT
Start: 2021-05-18 | End: 2021-05-18

## 2021-05-18 RX ORDER — GABAPENTIN 100 MG/1
200 CAPSULE ORAL 2 TIMES DAILY
COMMUNITY

## 2021-05-18 RX ORDER — LISINOPRIL 20 MG/1
40 TABLET ORAL DAILY
Status: DISCONTINUED | OUTPATIENT
Start: 2021-05-18 | End: 2021-05-19 | Stop reason: HOSPADM

## 2021-05-18 RX ORDER — INSULIN GLARGINE 100 [IU]/ML
100 INJECTION, SOLUTION SUBCUTANEOUS NIGHTLY
Status: DISCONTINUED | OUTPATIENT
Start: 2021-05-18 | End: 2021-05-19 | Stop reason: HOSPADM

## 2021-05-18 RX ORDER — ATORVASTATIN CALCIUM 80 MG/1
80 TABLET, FILM COATED ORAL NIGHTLY
Status: DISCONTINUED | OUTPATIENT
Start: 2021-05-18 | End: 2021-05-19 | Stop reason: HOSPADM

## 2021-05-18 RX ORDER — AMLODIPINE BESYLATE 10 MG/1
10 TABLET ORAL DAILY
COMMUNITY

## 2021-05-18 RX ORDER — POLYETHYLENE GLYCOL 3350 17 G/17G
17 POWDER, FOR SOLUTION ORAL DAILY PRN
COMMUNITY

## 2021-05-18 RX ORDER — ASPIRIN 81 MG/1
81 TABLET, CHEWABLE ORAL DAILY
Status: DISCONTINUED | OUTPATIENT
Start: 2021-05-19 | End: 2021-05-19 | Stop reason: HOSPADM

## 2021-05-18 RX ORDER — SODIUM CHLORIDE 0.9 % (FLUSH) 0.9 %
5-40 SYRINGE (ML) INJECTION PRN
Status: DISCONTINUED | OUTPATIENT
Start: 2021-05-18 | End: 2021-05-19 | Stop reason: HOSPADM

## 2021-05-18 RX ORDER — SODIUM CHLORIDE 0.9 % (FLUSH) 0.9 %
5-40 SYRINGE (ML) INJECTION EVERY 12 HOURS SCHEDULED
Status: DISCONTINUED | OUTPATIENT
Start: 2021-05-18 | End: 2021-05-19 | Stop reason: HOSPADM

## 2021-05-18 RX ORDER — GABAPENTIN 300 MG/1
300 CAPSULE ORAL 3 TIMES DAILY
Status: DISCONTINUED | OUTPATIENT
Start: 2021-05-18 | End: 2021-05-18

## 2021-05-18 RX ORDER — ACETAMINOPHEN 650 MG/1
650 SUPPOSITORY RECTAL EVERY 6 HOURS PRN
Status: DISCONTINUED | OUTPATIENT
Start: 2021-05-18 | End: 2021-05-19 | Stop reason: HOSPADM

## 2021-05-18 RX ORDER — NITROGLYCERIN 0.4 MG/1
0.4 TABLET SUBLINGUAL EVERY 5 MIN PRN
Status: DISCONTINUED | OUTPATIENT
Start: 2021-05-18 | End: 2021-05-19 | Stop reason: HOSPADM

## 2021-05-18 RX ORDER — PROMETHAZINE HYDROCHLORIDE 12.5 MG/1
12.5 TABLET ORAL EVERY 6 HOURS PRN
Status: DISCONTINUED | OUTPATIENT
Start: 2021-05-18 | End: 2021-05-19 | Stop reason: HOSPADM

## 2021-05-18 RX ORDER — HEPARIN SODIUM 5000 [USP'U]/ML
5000 INJECTION, SOLUTION INTRAVENOUS; SUBCUTANEOUS ONCE
Status: COMPLETED | OUTPATIENT
Start: 2021-05-18 | End: 2021-05-18

## 2021-05-18 RX ORDER — DEXTROSE MONOHYDRATE 25 G/50ML
12.5 INJECTION, SOLUTION INTRAVENOUS PRN
Status: DISCONTINUED | OUTPATIENT
Start: 2021-05-18 | End: 2021-05-19 | Stop reason: HOSPADM

## 2021-05-18 RX ORDER — ASPIRIN 81 MG/1
324 TABLET, CHEWABLE ORAL ONCE
Status: COMPLETED | OUTPATIENT
Start: 2021-05-18 | End: 2021-05-18

## 2021-05-18 RX ADMIN — HEPARIN SODIUM 5000 UNITS: 5000 INJECTION INTRAVENOUS; SUBCUTANEOUS at 06:28

## 2021-05-18 RX ADMIN — ONDANSETRON 4 MG: 2 INJECTION INTRAMUSCULAR; INTRAVENOUS at 03:26

## 2021-05-18 RX ADMIN — ASPIRIN 324 MG: 81 TABLET, CHEWABLE ORAL at 06:08

## 2021-05-18 RX ADMIN — AMLODIPINE BESYLATE 10 MG: 10 TABLET ORAL at 16:10

## 2021-05-18 RX ADMIN — TAMSULOSIN HYDROCHLORIDE 0.4 MG: 0.4 CAPSULE ORAL at 16:10

## 2021-05-18 RX ADMIN — SODIUM CHLORIDE 1000 ML: 9 INJECTION, SOLUTION INTRAVENOUS at 07:52

## 2021-05-18 RX ADMIN — LISINOPRIL 40 MG: 20 TABLET ORAL at 16:10

## 2021-05-18 RX ADMIN — MORPHINE SULFATE 4 MG: 2 INJECTION, SOLUTION INTRAMUSCULAR; INTRAVENOUS at 03:25

## 2021-05-18 RX ADMIN — GABAPENTIN 200 MG: 100 CAPSULE ORAL at 21:10

## 2021-05-18 RX ADMIN — Medication 5000 UNITS: at 16:10

## 2021-05-18 RX ADMIN — Medication 10 ML: at 23:30

## 2021-05-18 RX ADMIN — ATORVASTATIN CALCIUM 80 MG: 80 TABLET, FILM COATED ORAL at 21:10

## 2021-05-18 RX ADMIN — INSULIN HUMAN 30 UNITS: 100 INJECTION, SOLUTION PARENTERAL at 17:28

## 2021-05-18 RX ADMIN — FUROSEMIDE 20 MG: 20 TABLET ORAL at 16:10

## 2021-05-18 RX ADMIN — INSULIN GLARGINE 100 UNITS: 100 INJECTION, SOLUTION SUBCUTANEOUS at 21:12

## 2021-05-18 RX ADMIN — CIPROFLOXACIN 400 MG: 2 INJECTION, SOLUTION INTRAVENOUS at 06:08

## 2021-05-18 RX ADMIN — IOPAMIDOL 100 ML: 755 INJECTION, SOLUTION INTRAVENOUS at 04:25

## 2021-05-18 RX ADMIN — CEFTRIAXONE SODIUM 1000 MG: 1 INJECTION, POWDER, FOR SOLUTION INTRAMUSCULAR; INTRAVENOUS at 08:14

## 2021-05-18 ASSESSMENT — ENCOUNTER SYMPTOMS
SHORTNESS OF BREATH: 0
COLOR CHANGE: 0
ABDOMINAL PAIN: 0
CHEST TIGHTNESS: 0
VOMITING: 0
NAUSEA: 0

## 2021-05-18 ASSESSMENT — PAIN SCALES - GENERAL
PAINLEVEL_OUTOF10: 0
PAINLEVEL_OUTOF10: 5

## 2021-05-18 ASSESSMENT — PAIN DESCRIPTION - LOCATION: LOCATION: HEAD

## 2021-05-18 NOTE — PROGRESS NOTES
Wound Ostomy Continence Nurse  Consult Note       NAME:  Mala Rothman  MEDICAL RECORD NUMBER:  [id number]  AGE: 77 y.o. GENDER: male  : 1955  TODAY'S DATE:  2021    Subjective   Reason for [id number] Nurse Evaluation and Assessment: Left great toe wound      Mala Rothman is a 77 y.o. male referred by:   [] Physician  [x] Nursing  [] Other:     Wound Identification:  Wound Type: diabetic  Contributing Factors: diabetes and callous removal    Wound History: Per patient, he used a home manicure set to remove his callous to the left great toe. Per patient, he has appointment with [facility] podiatry on  to have it looked at; no s/sx of infection. Current Wound Care Treatment: Cleanse wound with saline and dry with 4x4; Cut Maxorb Ag to fit wound bed and apply; Cover with 2x2 and secure with roll gauze; Change Tu, Th, and Sa    Patient Goal of Care:  [x] Wound Healing  [] Odor Control  [] Palliative Care  [] Pain Control   [] Other:         PAST MEDICAL HISTORY        Diagnosis Date    Hypertension     Hypogonadism male     Type II or unspecified type diabetes mellitus without mention of complication, not stated as uncontrolled        PAST SURGICAL HISTORY    Past Surgical History:   Procedure Laterality Date     THYROID BIOPSY  2020    US THYROID BIOPSY 2020 MLOX RAD VASC INTERVNL       FAMILY HISTORY    History reviewed. No pertinent family history. SOCIAL HISTORY    Social History     Tobacco Use    Smoking status: Former Smoker     Types: Cigarettes     Quit date: 1975     Years since quittin.4    Smokeless tobacco: Never Used   Substance Use Topics    Alcohol use: Not on file    Drug use: Not on file       ALLERGIES    No Known Allergies    MEDICATIONS    No current facility-administered medications on file prior to encounter.      Current Outpatient Medications on File Prior to Encounter   Medication Sig Dispense Refill    gabapentin (NEURONTIN) 100 MG capsule Take 200 HCT 39.8 05/18/2021     PTT:    Lab Results   Component Value Date    APTT 28.0 05/18/2021   [APTT}  PT/INR:    Lab Results   Component Value Date    PROTIME 14.4 05/18/2021    INR 1.1 05/18/2021     HgBA1c:    Lab Results   Component Value Date    LABA1C 6.9 04/06/2021       Assessment   El Risk Score: El Scale Score: 22    Patient Active Problem List   Diagnosis    Type II diabetes mellitus, uncontrolled [facility]    Hypogonadism male    Hypertension    Obesity    Cardiomyopathy due to hypertension, without heart failure (HCC)    BAKER (dyspnea on exertion)    Hypertrophic nonobstructive cardiomyopathy (HCC)    Syncope and collapse    Abnormal EKG    Hypercalcemia    Tremor    Right thyroid nodule    Abnormal ultrasound of thyroid gland    Nontransmural myocardial infarction ([facility]    NSTEMI (non-ST elevated myocardial infarction) ([facility]       Measurements:  Wound 05/18/21 Toe (Comment  which one) Left;Plantar Left Great Toe (Active)   Wound Etiology Diabetic 05/18/21 1016   Dressing Status New dressing applied 05/18/21 1016   Wound Cleansed Cleansed with saline 05/18/21 1016   Dressing/Treatment Alginate with Ag;Gauze dressing/dressing sponge 05/18/21 1016   Dressing Change Due 05/20/21 05/18/21 1016   Wound Length (cm) 1 cm 05/18/21 1016   Wound Width (cm) 1 cm 05/18/21 1016   Wound Depth (cm) 0.1 cm 05/18/21 1016   Wound Surface Area (cm^2) 1 cm^2 05/18/21 1016   Wound Volume (cm^3) 0.1 cm^3 05/18/21 1016   Wound Assessment Pink/red 05/18/21 1016   Drainage Amount Scant 05/18/21 1016   Drainage Description Sanguinous 05/18/21 1016   Odor None 05/18/21 1016   Rosmery-wound Assessment Hyperkeratosis (callous) 05/18/21 1016   Margins Defined edges 05/18/21 1016   Wound Thickness Description not for Pressure Injury Full thickness 05/18/21 1016   Number of days: 0     Assessment:    Left great toe wound on the pedal aspect of the toe. Wound is clean and red, no s/sx of infection noted.  Rosmery wound skin is calloused. Dressing applied at this time.       Plan   Plan of Care: Wound 05/18/21 Toe (Comment  which one) Left;Plantar Left Great Toe-Dressing/Treatment: Alginate with Ag, Gauze dressing/dressing sponge    Specialty Bed Required : N/A   [] Low Air Loss   [] Pressure Redistribution  [] Fluid Immersion  [] Bariatric  [] Other:     Current Diet: DIET LOW FAT; No Caffeine  Dietician consult:  N/A    Discharge Plan:  Placement for patient upon discharge: home with support    Patient appropriate for Outpatient [address] [address]: N/A - follows [facility] wound care    Referrals:  []   [] [facility]  [] Supplies  [] Other    Patient/Caregiver Teaching:  Level of patient/caregiver understanding able to:   [] Indicates understanding       [] Needs reinforcement  [] Unsuccessful      [] Verbal Understanding  [] Demonstrated understanding       [] No evidence of learning  [] Refused teaching         [x] N/A       Electronically signed by Devera Kanner, BSN, RN, Edith Astudillo on 5/18/2021 at 10:21 AM

## 2021-05-18 NOTE — H&P
History and Physical  Patient: Cecil Artis  Unit/Bed:W172/W172-01  YOB: 1955  MRN: [id number]  Acct: [de-identified]   Admitting Diagnosis: Nontransmural myocardial infarction [facility]) [I21.4]  NSTEMI (non-ST elevated myocardial infarction) [facility]) [I21.4]  Admit Date:  5/18/2021  Hospital Day: 1      Chief Complaint:   Chest pain    History of Present Illness: This is a very pleasant 24-year-old  male with past medical history significant for hypertension, dyslipidemia, diabetes, nephrolithiasis, hyperparathyroidism/hypercalcemia for which he is following with thyroid specialist through [id number], history of diastolic congestive heart failure, and tobacco abuse who presented to the emergency room early this morning with chief complaint of chest pain. Patient awakened from sleep early this morning to use the restroom at which time he began experiencing charley horses in both thighs and developed an onset of midsternal to left-sided chest pressure and heaviness. He rated the discomfort at a 2 out of 10 on a 1-10 pain scale. This chest pressure was associated with mild nausea but no vomiting. He denied diaphoresis, palpitations, dizziness, lightheadedness, paroxysmal nocturnal dyspnea, orthopnea, syncope, fever or chills. His chest pressure persisted for approximately 15 minutes and his charley horses were so severe in his legs that he was unable to get himself off the ground so EMS was called to bring him into the ER for further evaluation. On presentation to the emergency room, blood pressure 112/39, heart rate 74, respiratory rate 13, pulse ox of 99%, temperature 99.5 °F.  Sodium 135, potassium 3.9, chloride 101, total CO2 24, BUN elevated 29, creatinine elevated 1.24, GFR low at 58.1, glucose 82.  proBNP 297. Troponin mildly elevated 0.021. WBC mildly elevated 11.9, hemoglobin 13.3, hematocrit 39.8, platelets 203. D-dimer elevated 0.88.   CTA of the chest revealed no evidence of pulmonary embolism or acute intrathoracic process. CT of the abdomen pelvis revealed nonspecific mild distention of the gallbladder, no radiopaque gallstones, gallbladder wall thickening or pericholecystic fluid, no significant interval change of a 3.5 cm right adrenal nodule, bilateral nonobstructing renal calculi. Chest x-ray revealed no acute cardiopulmonary disease. Urinalysis showed moderate leukocyte esterase but was negative for nitrites and urine culture is pending. Patient received Cipro p.o. in the ER and was later transitioned to IV Rocephin. EKG showed sinus tachycardia with ventricular rate of 113 bpm with right bundle branch block and left anterior fascicular block with prolonged QTC of 526 ms. He was admitted for further evaluation. At time of evaluation today, patient is seen on 1 W. resting comfortably and in no acute distress. He states his chest pain resolved after approximately 15 minutes prior to EMS arrival and he has had no recurrent episodes. He denies any recent exertional chest pain or exertional shortness of breath episodes. 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 He is hemodynamically stable. Currently on telemetry he is maintaining sinus bradycardia with heart rate in the 50s. Hospitalist on consult regarding leukocytosis and possible UTI. He follows with [facility] cardiology and reports having had a recent negative stress test in September or October 2020. Recent echocardiogram in September 2020 showed normal LV systolic function and no significant valvular heart disease.         No Known Allergies    Current Facility-Administered Medications   Medication Dose Route Frequency Provider Last Rate Last Admin    sodium chloride flush 0.9 % injection 5-40 mL  5-40 mL Intravenous 2 times per day Corrie Easley MD        sodium chloride flush 0.9 % injection 5-40 mL  5-40 mL Intravenous PRN Corrie Easley MD  0.9 % sodium chloride infusion  25 mL Intravenous PRN Romain Rodrigues MD        promethazine (PHENERGAN) tablet 12.5 mg  12.5 mg Oral Q6H PRN Romain Rodrigues MD        Or    ondansetron [facility]) injection 4 mg  4 mg Intravenous Q6H PRN Romain Rodrigues MD        acetaminophen (TYLENOL) tablet 650 mg  650 mg Oral Q6H PRN Romain Rodrigues MD        Or    acetaminophen (TYLENOL) suppository 650 mg  650 mg Rectal Q6H PRN Romain Rodrigues MD        polyethylene glycol [facility]) packet 17 g  17 g Oral Daily PRN Romain Rodrigues MD        [START ON 5/19/2021] aspirin chewable tablet 81 mg  81 mg Oral Daily Romain Rodrigues MD        atorvastatin (LIPITOR) tablet 80 mg  80 mg Oral Nightly Romain Rodrigues MD        nitroGLYCERIN (NITROSTAT) SL tablet 0.4 mg  0.4 mg Sublingual Q5 Min PRN Romain Rodrigues MD        heparin 25,000 units in dextrose 5% 250 mL (premix) infusion  5-30 Units/kg/hr Intravenous Continuous Romain Rodrigues MD        gabapentin (NEURONTIN) capsule 300 mg  300 mg Oral TID Romain Rodrigues MD        insulin regular (HUMULIN R;NOVOLIN R) injection 30 Units  30 Units Subcutaneous TID AC Margrette Bliss, APRN - CNP        tamsulosin (FLOMAX) capsule 0.4 mg  0.4 mg Oral Dinner Margrette Bliss, APRN - CNP        vitamin D (CHOLECALCIFEROL) capsule 5,000 Units  5,000 Units Oral Daily Margrette Bliss, APRN - CNP        glucose (GLUTOSE) 40 % oral gel 15 g  15 g Oral PRN Margrette Bliss, APRN - CNP        dextrose 50 % IV solution  12.5 g Intravenous PRN Margrette Bliss, APRN - CNP        glucagon (rDNA) injection 1 mg  1 mg Intramuscular PRN Margrette Bliss, APRN - CNP        dextrose 5 % solution  100 mL/hr Intravenous PRN Margrette Bliss, APRN - CNP        insulin glargine (LANTUS) injection vial 100 Units  100 Units Subcutaneous Nightly Margrette Bliss, APRN - CNP           PMHx:  Past Medical History:   Diagnosis Date    Hypertension     Hypogonadism male     Type II or unspecified type diabetes mellitus without mention of complication, not stated as uncontrolled        PSHx:  Past Surgical History:   Procedure Laterality Date    US THYROID BIOPSY  2020    US THYROID BIOPSY 2020 [facility]       Social Hx:  Social History     Socioeconomic History    Marital status:      Spouse name: None    Number of children: None    Years of education: None    Highest education level: None   Occupational History    None   Tobacco Use    Smoking status: Former Smoker     Types: Cigarettes     Quit date: 1975     Years since quittin.4    Smokeless tobacco: Never Used   Substance and Sexual Activity    Alcohol use: None    Drug use: None    Sexual activity: None   Other Topics Concern    None   Social History Narrative    None     Social Determinants of Health     Financial Resource Strain:     Difficulty of Paying Living Expenses:    Food Insecurity:     Worried About Running Out of Food in the Last Year:     Ran Out of Food in the Last Year:    Transportation Needs:     Lack of Transportation (Medical):  Lack of Transportation (Non-Medical):    Physical Activity:     Days of Exercise per Week:     Minutes of Exercise per Session:    Stress:     Feeling of Stress :    Social Connections:     Frequency of Communication with Friends and Family:     Frequency of Social Gatherings with Friends and Family:     Attends Zoroastrianism Services:     Active Member of Clubs or Organizations:     Attends Club or Organization Meetings:     Marital Status:    Intimate Partner Violence:     Fear of Current or Ex-Partner:     Emotionally Abused:     Physically Abused:     Sexually Abused:        Family Hx:  History reviewed. No pertinent family history. Review ofSystems:   Review of Systems   Constitutional: Negative for activity change, fatigue and fever. HENT: Negative for congestion. Respiratory: Negative for chest tightness and shortness of breath.     Cardiovascular: Positive for chest pain (chest pressure this AM) and leg swelling (bilateral). Negative for palpitations. Gastrointestinal: Negative for abdominal pain, nausea and vomiting. Genitourinary: Negative for difficulty urinating. Musculoskeletal: Negative for arthralgias. Skin: Negative for color change, pallor and rash. Neurological: Negative for dizziness, syncope and light-headedness. Psychiatric/Behavioral: Negative for agitation and behavioral problems. Physical Examination:    BP (!) 105/47   Pulse 52   Temp 98.2 °F (36.8 °C) (Oral)   Resp 18   Ht 5' 11\" (1.803 m)   Wt 293 lb 8 oz (133.1 kg)   SpO2 99%   BMI 40.93 kg/m²    Physical Exam  Constitutional:       Appearance: Normal appearance. He is obese. HENT:      Head: Normocephalic and atraumatic. Cardiovascular:      Rate and Rhythm: Normal rate and regular rhythm. Heart sounds: No murmur heard. Pulmonary:      Effort: Pulmonary effort is normal. No respiratory distress. Breath sounds: No wheezing, rhonchi or rales. Abdominal:      Palpations: Abdomen is soft. Tenderness: There is no abdominal tenderness. Musculoskeletal:         General: Deformity: 2+ Normal range of motion. Cervical back: Normal range of motion and neck supple. Right lower leg: Edema (2+) present. Left lower leg: Edema present. Skin:     General: Skin is warm and dry. Neurological:      General: No focal deficit present. Mental Status: He is alert and oriented to person, place, and time. Cranial Nerves: No cranial nerve deficit.    Psychiatric:         Mood and Affect: Mood normal.         Behavior: Behavior normal.          LABS:  CBC:   Lab Results   Component Value Date    WBC 12.6 05/18/2021    RBC 4.06 05/18/2021    RBC 4.66 01/07/2012    HGB 12.1 05/18/2021    HCT 36.2 05/18/2021    MCV 89.2 05/18/2021    MCH 29.9 05/18/2021    MCHC 33.6 05/18/2021    RDW 14.0 05/18/2021     05/18/2021    MPV 8.0 01/07/2012     CBC with Differential:   Lab Results   Component Value Date    WBC 12.6 05/18/2021    RBC 4.06 05/18/2021    RBC 4.66 01/07/2012    HGB 12.1 05/18/2021    HCT 36.2 05/18/2021     05/18/2021    MCV 89.2 05/18/2021    MCH 29.9 05/18/2021    MCHC 33.6 05/18/2021    RDW 14.0 05/18/2021    LYMPHOPCT 5.4 05/18/2021    MONOPCT 8.7 05/18/2021    BASOPCT 0.5 05/18/2021    MONOSABS 1.0 05/18/2021    LYMPHSABS 0.6 05/18/2021    EOSABS 0.1 05/18/2021    BASOSABS 0.1 05/18/2021     CMP:    Lab Results   Component Value Date     05/18/2021    K 3.9 05/18/2021     05/18/2021    CO2 24 05/18/2021    BUN 29 05/18/2021    CREATININE 1.2 05/18/2021    CREATININE 1.24 05/18/2021    GFRAA >60 05/18/2021    LABGLOM >60 05/18/2021    GLUCOSE 82 05/18/2021    GLUCOSE 212 02/16/2012    PROT 7.3 05/18/2021    LABALBU 4.2 05/18/2021    LABALBU 4.0 02/16/2012    CALCIUM 11.2 05/18/2021    BILITOT 0.4 05/18/2021    ALKPHOS 87 05/18/2021    AST 15 05/18/2021    ALT 13 05/18/2021     BMP:    Lab Results   Component Value Date     05/18/2021    K 3.9 05/18/2021     05/18/2021    CO2 24 05/18/2021    BUN 29 05/18/2021    LABALBU 4.2 05/18/2021    LABALBU 4.0 02/16/2012    CREATININE 1.2 05/18/2021    CREATININE 1.24 05/18/2021    CALCIUM 11.2 05/18/2021    GFRAA >60 05/18/2021    LABGLOM >60 05/18/2021    GLUCOSE 82 05/18/2021    GLUCOSE 212 02/16/2012     Magnesium:    Lab Results   Component Value Date    MG 1.8 05/18/2021     Troponin:    Lab Results   Component Value Date    TROPONINI 0.017 05/18/2021     Recent Labs     05/18/21  0300   PROBNP 297     Recent Labs     05/18/21  0300   INR 1.1       RADIOLOGY:  CTA CHEST W WO CONTRAST - r/o Pulmonary Embolism    Result Date: 5/18/2021  EXAM: CTA CHEST W WO CONTRAST History: Chest pain Technique: Multiple contiguous axial images of the chest were obtained from the thoracic inlet through the upper abdomen with contrast. Multiplanar reformats including maximum intensity projection images were obtained. Comparison: CT chest October 17, 2020 Findings: Small bilateral thyroid nodules are not changed from prior examination. No axillary, mediastinal, or hilar lymphadenopathy. No thoracic aortic aneurysm or dissection. No filling defect within the pulmonary arteries. Heart size is within normal limits. Evidence of coronary artery disease. No significant pericardial effusion. Esophagus is within normal limits. No pulmonary nodule or mass. No consolidation, pleural effusion, or pneumothorax. Minimal bilateral dependent subsegmental atelectasis. No acute osseous abnormality. Diffuse intrahepatic skeletal hyperostosis again identified. Visualized upper abdomen demonstrates no acute abnormality. No pulmonary embolism or acute intrathoracic process. All CT scans at this facility use dose modulation, iterative reconstruction, and/or weight based dosing when appropriate to reduce radiation dose to as low as reasonably achievable. CT ABDOMEN PELVIS W IV CONTRAST Additional Contrast? None    Result Date: 5/18/2021  EXAM:  CT ABDOMEN PELVIS W IV CONTRAST History: Abdominal pain Technique: Multiple contiguous axial images were obtained of the abdomen and pelvis from an level of the lung bases through the ischial tuberosities with IV contrast. Multiplanar reformats were obtained. Delayed images were obtained. Comparison: CT abdomen pelvis February 21, 2018 Findings: Lung bases are clear. A significant interval change of a right adrenal nodule measuring approximately 3.5 cm. The stomach, spleen, pancreas, left adrenal gland, and liver are within normal limits. The gallbladder is mildly distended but otherwise unremarkable. The kidneys enhance uniformly. Unchanged small right kidney cyst. Bilateral renal calculi are again identified, largest on the right measures approximately 3 mm and the largest on the left measures approximately 9 mm. No hydronephrosis.  The urinary bladder is well distended. The prostate is enlarged. Abdominal aorta is nonaneurysmal  . No retroperitoneal or abdominal/pelvic lymphadenopathy. No small bowel obstruction. No overt colonic mass or pericolonic inflammation. Stool is seen throughout the colon. No findings of acute appendicitis. No free fluid, loculated fluid collection, or pneumoperitoneum. No acute osseous abnormality. Degenerative changes of the lumbar spine. Nonspecific mild distention of the gallbladder. No radiopaque gallstones, gallbladder wall thickening, or pericholecystic fluid. No significant interval change of a 3.5 cm right adrenal nodule. Bilateral nonobstructing renal calculi. Stool is seen throughout the colon may represent constipation. The prostate is enlarged. All CT scans at this facility use dose modulation, iterative reconstruction, and/or weight based dosing when appropriate to reduce radiation dose to as low as reasonably achievable. XR CHEST PORTABLE    Result Date: 5/18/2021  EXAMINATION: XR CHEST PORTABLE CLINICAL HISTORY: CHEST PAIN COMPARISONS: CT CHEST, OCTOBER 17, 2020 FINDINGS: Osseous structures are intact. Cardiopericardial silhouette is normal. Pulmonary vasculature is normal. Lungs are clear. NO ACUTE CARDIOPULMONARY DISEASE. Echocardiogram at [facility] [place] 9/4/20:  Echocardiogram, 9.4.2020  Echo 9/4/20:  Impression  CONCLUSIONS:   - Technically difficult exam due to body habitus. - Exam indication: Abnormal ECG   - The left ventricle is normal in size. There is moderate concentric left   ventricular hypertrophy. Left ventricular systolic function is normal. EF = 60 ±   5% (2D biplane) Definity contrast used for endocardial border detection.   - The right ventricle is normal in size. Right ventricular systolic function is   normal.   - The left atrial cavity is mildly dilated. - The right atrial cavity is moderately dilated. - There are no significant valvular abnormalities.    - Estimated right ventricular systolic pressure is not reported due to an   insufficient tricuspid regurgitation signal. Estimated right atrial pressure is 3   mmHg based on IVC assessment. - Exam was compared with the prior  echocardiographic exam performed on   5/15/2018. There is no significant change. Electronically signed by Babar Polanco MD on 9/4/2020       EKG 5/18/21: , RBBB, LAFB, ST depression with T wave inversion lead aVL and V1-V2, QTc 526ms    Telemetry 5/18/21: SB 50s      Assessment:    Active Hospital Problems    Diagnosis Date Noted    Nontransmural myocardial infarction [facility]) [I21.4] 05/18/2021    NSTEMI (non-ST elevated myocardial infarction) ([facility] [address] [I21.4] 05/18/2021     1. Chest pain r/o cardiac ischemia  2. Elevated troponin  3. Hyperparathyroidism/hypercalcemia  4. HTN  5. DM  6. Morbid obesity  7. Abnormal EKG  8. Sinus bradycardia  9. Leukocytosis/possible UTI    Plan:  1. Admit to telemetry  2. ACS orders initiated  3. Maximize medical therapy-aspirin 81 mg p.o. daily, no beta-blocker for now secondary to sinus bradycardia, Lipitor 80 mg p.o. daily at bedtime, consider addition of ACE inhibitor/angiotensin receptor blocker in future if BP tolerates and renal function remains stable, sublingual nitroglycerin as needed for chest pain  4. Cardiac diet recommended  5. Check echocardiogram  6. Monitor on telemetry for any tachycardia or bradycardia arrhythmias  7. Maintain potassium greater than 4, magnesium greater than 2  8. GI/DVT prophylaxis  9. Internal medicine recommendations regarding possible UTI/leukocytosis--on IV Rocephin for now and urine cultures pending  10. Coronary evaluation per Dr. Alyssa Snell need to consider cardiac catheterization for definitive diagnosis given multiple risk factors for CAD, mild troponin elevation as well as patient's need for cardiac clearance prior to parathyroidectomy.   Patient states he follows with Dr. Sarahy Lawler from [facility] and if any further cardiac testing is warranted he is requesting to follow-up with his regular cardiologist for further recommendations.   11. Further recommendations to follow        Electronically signed by YADIEL Doyle on 5/18/2021 at 12:29 PM

## 2021-05-18 NOTE — CONSULTS
Consult Note    Reason for Consult: Co-management of chronic medical conditions including type 2 diabetes, renal insufficiency and acute cystitis. Requesting Physician:  Makeda Taylor MD    HISTORY OF PRESENT ILLNESS:    The patient is a 77 y.o. male with past medical history of hyperparathyroidism, chronic heart failure, type 2 diabetes, CKD, cardiomyopathy, obesity, hypertension, hyperlipidemia, BPH with chronic urinary symptoms who initially presented to [facility] emergency room with complaints of left-sided chest pressure without radiation accompanied with shortness of breath that started approximately 2 hours prior to arrival.  Patient given 325 mg aspirin in route, chest pain had resolved on arrival.  Initial work-up showed troponin of 0.021, calcium 11.2, lactic acid of 2.66 and D-dimer of 0.88, CTA negative for PE. Patient reports over the last few months he has had mild tremor and multiple renal calculi secondary to his hyperparathyroidism. He has been treated several times for urinary tract infections which were likely secondary to renal calculi. He also mentions having an unsteady gait due to left ear trauma from a fall 2 months ago. He has chronic 2+ pitting edema lower extremities. He denies any current chest pain or pressure, no current palpitations, fever, shortness of breath, lightheadedness or dizziness. He denies dysuria, frequency or hesitation. He is compliant with all medications he is hemodynamically stable and afebrile. Past Medical History:   Diagnosis Date    Hypertension     Hypogonadism male     Type II or unspecified type diabetes mellitus without mention of complication, not stated as uncontrolled        Past Surgical History:   Procedure Laterality Date    US THYROID BIOPSY  11/24/2020     THYROID BIOPSY 11/24/2020 MLOX RAD VASC INTERVNL       Prior to Admission medications    Medication Sig Start Date End Date Taking?  Authorizing Provider   gabapentin Abused:     Sexually Abused:        History reviewed. No pertinent family history. Review Of Systems:   Constitutional:  Denies fever, chills, weight loss or weakness   Eyes:  Denies photophobia or discharge   HENT:  Denies sore throat or ear pain   Respiratory:  Denies cough or shortness of breath   Cardiovascular: Complains of chest pain, but denies palpitations or swelling   GI:  Denies abdominal pain, nausea, vomiting, or diarrhea   Musculoskeletal:  Denies back pain   Skin:  Denies rash   Neurologic:  Denies headache, focal weakness or sensory changes   Endocrine:  Denies polyuria or polydypsia   Lymphatic:  Denies swollen glands   Psychiatric:  Denies depression, suicidal ideation or homicidal ideation   All systems negative except as marked. Physical Exam:  Vitals:    05/18/21 0749 05/18/21 0813 05/18/21 0836 05/18/21 0916   BP: (!) 100/55 (!) 107/53 (!) 97/53 (!) 105/47   Pulse:  58 54 52   Resp:  18 16 18   Temp:    98.2 °F (36.8 °C)   TempSrc:    Oral   SpO2:  100% 100% 99%   Weight:    293 lb 8 oz (133.1 kg)   Height:    5' 11\" (1.803 m)       Constitutional: Obese, mild acute distress, Non-toxic appearance. HENT:  Normocephalic, Atraumatic, Bilateral external ears normal, Oropharynx moist, No oral exudates, Nose normal. Neck- Normal range of motion, No tenderness, Supple, No stridor. Eyes:  PERRL, EOMI, Conjunctiva normal, No discharge. Respiratory:  Normal breath sounds, No respiratory distress, No wheezing, No chest tenderness. Cardiovascular: Tachycardic, Normal rhythm, No rubs, No gallops. GI:  Bowel sounds normal, Soft, No tenderness, No masses, No pulsatile masses. :  No CVA tenderness. Musculoskeletal:  Intact distal pulses,  bilateral pitting  edema 2+, No calf tenderness, No cyanosis, No clubbing. Good range of motion in all major joints. No tenderness to palpation or major deformities noted. Back- No tenderness.    Integument:  Warm, Dry, No erythema   Lymphatic:   Lower Result Value Ref Range    Troponin 0.021 (HH) 0.000 - 0.010 ng/mL   Brain Natriuretic Peptide    Collection Time: 05/18/21  3:00 AM   Result Value Ref Range    Pro- pg/mL   Protime-INR    Collection Time: 05/18/21  3:00 AM   Result Value Ref Range    Protime 14.4 12.3 - 14.9 sec    INR 1.1    APTT    Collection Time: 05/18/21  3:00 AM   Result Value Ref Range    aPTT 28.0 24.4 - 36.8 sec   Urinalysis, reflex to microscopic    Collection Time: 05/18/21  3:00 AM   Result Value Ref Range    Color, UA Yellow Straw/Yellow    Clarity, UA Clear Clear    Glucose, Ur Negative Negative mg/dL    Bilirubin Urine Negative Negative    Ketones, Urine Negative Negative mg/dL    Specific Gravity, UA 1.014 1.005 - 1.030    Blood, Urine Negative Negative    pH, UA 7.5 5.0 - 9.0    Protein, UA TRACE (A) Negative mg/dL    Urobilinogen, Urine 1.0 <2.0 E.U./dL    Nitrite, Urine Negative Negative    Leukocyte Esterase, Urine MODERATE (A) Negative   D-Dimer, Quantitative    Collection Time: 05/18/21  3:00 AM   Result Value Ref Range    D-Dimer, Quant 0.88 (HH) 0.00 - 0.50 mg/L FEU   Lipase    Collection Time: 05/18/21  3:00 AM   Result Value Ref Range    Lipase 14 12 - 95 U/L   Magnesium    Collection Time: 05/18/21  3:00 AM   Result Value Ref Range    Magnesium 1.7 1.7 - 2.4 mg/dL   Microscopic Urinalysis    Collection Time: 05/18/21  3:00 AM   Result Value Ref Range    Bacteria, UA FEW (A) Negative /HPF    Hyaline Casts, UA 1-3 0 - 5 /HPF    WBC, UA 20-50 (A) 0 - 5 /HPF    Epithelial Cells, UA 0-2 0 - 5 /HPF   Beta-Hydroxybutyrate    Collection Time: 05/18/21  3:15 AM   Result Value Ref Range    Beta-Hydroxybutyrate 2.0 0.2 - 2.8 mg/dL   Lactate, Sepsis    Collection Time: 05/18/21  3:15 AM   Result Value Ref Range    Lactic Acid, Sepsis 1.1 0.5 - 1.9 mmol/L   COVID-19, Rapid    Collection Time: 05/18/21  3:21 AM    Specimen: Nasopharyngeal Swab   Result Value Ref Range    SARS-CoV-2, NAAT Not Detected Not Detected   POCT Venous Collection Time: 05/18/21  3:51 AM   Result Value Ref Range    POC Sodium 136 136 - 145 mEq/L    POC Potassium 3.6 3.5 - 5.1 mEq/L    POC Chloride 105 99 - 110 mEq/L    POC Glucose 78 60 - 115 mg/dl    POC Creatinine 1.2 0.8 - 1.3 mg/dL    GFR Non-African American >60 >60    GFR African American >60 >60    Calcium, Ion 1.25 1.12 - 1.32 mmol/L    pH, Alem 7.430 7.350 - 7.450    pCO2, Alem 36.6 (L) 40.0 - 50.0 mm Hg    pO2, Alem 54 Not Established mm Hg    HCO3, Venous 24.3 23.0 - 29.0 mmol/L    Base Excess, Alem 0 -3 - 3    O2 Sat, Alem 89 Not Established %    TC02 (Calc), Alem 25 Not Established mmol/L    Lactate 2.66 (H) 0.40 - 2.00 mmol/L    Hemoglobin 15.3 13.5 - 17.5 gm/dL    POC Hematocrit 45 41 - 53 %    Sample Type ALEM     Performed on SEE BELOW    Troponin    Collection Time: 05/18/21  6:15 AM   Result Value Ref Range    Troponin 0.017 (HH) 0.000 - 0.010 ng/mL     Assessment/Plan:  1. NSTEMI II most likely; Elevated trops likely due to accumulation 2ndry to decreased renal excretion as a result of KOLBY. Continue to trend and assess. Admitted under cardiology service to exclude ACS or CAD. Goal K and Mg 4.0 and 2.0, respectively. If indicated, will need to replace K carefully in setting of renal insufficiency. EKG in AM. Telemetry ordered. 2. Mild KOLBY: Avoid nephrotoxic agents wherever possible. Repeat BMP in AM. Strict Intake output. Avoiding fluctuations in BP. 3. Elevated D-dimer: CTA negative for PE. Obtain bilateral lower extremity venous Doppler to rule out DVT. 4. Acute cystitis (bilateral nonobstructing calculi):  Pain control, IV antibiotics and fluids received while in the emergency room,  Awaiting urine cultures. 5. DMII: Home regimen ordered, hypoglycemia protocol POCT Glucose TIDAC & QHS  6. Essential tremor secondary to hypercalcemia, hyperparathyroidism: Also history of adrenal adenoma (right), follows with endocrinology.   Is scheduled to see ENT for parathyroidectomy May 25th.    7. BPH with bladder obstruction: Flomax ordered. Monitor for signs of urinary retention, I&Os   8. HTN: Stable. Continue home meds    Electronically signed by ANDREW Romero CNP on 5/18/21 at 10:41 AM EDT    I saw and evaluated the pt. I personally obtained the key and critical portions of the history and physical exam and made additions where appropriate in the documentation.  I reviewed the mid level documentation and agree with assessment and plan that we come up with together    Rashaad Marques, DO  Internal Medicine

## 2021-05-18 NOTE — ED NOTES
This RN attempted to call report to 1W. The  said the nurse was discharging a patient and will call back.       Joann Griffith RN  05/18/21 6937

## 2021-05-18 NOTE — FLOWSHEET NOTE
Patient resting in bed at this time with wife at bedside. Aware of plan for cardiac catheterization tomorrow 5/19/21. Call light remains in reach.  Electronically signed by Kamilah Perez RN on 5/18/2021 at 5:34 PM

## 2021-05-18 NOTE — FLOWSHEET NOTE
I called [facility] pharmacy to verify patients home medications. Admission completed and Vale Black notified.  Electronically signed by Julia Salguero RN on 5/18/2021 at 2:17 PM

## 2021-05-18 NOTE — PROGRESS NOTES
PHARMACY NOTE  Cecil Artis was ordered [facility]. Per the [address], this medication is non-formulary and not stocked by pharmacy. Lantus was substituted. The medication can be reordered at discharge.

## 2021-05-18 NOTE — ED NOTES
Patient and wife updated on status, await CT result for planning     Dossie Amanda RN  05/18/21 6120

## 2021-05-18 NOTE — ED PROVIDER NOTES
Dr. Marko De Souza spoke with Dr. Juan Dominguez who accepted the patient. Patient on re-exam dry MM. Elevated trop. Hospitalist wants patient admitted to [facility]. Patient has a [facility] cardiologist but had seen Dr. Aurelio Larkin with [facility] around 1 year ago. Patient non-toxic on re-exam. Josseline Arriola. LCTA. Dry MM. Patient had received cipro from Dr. Nnamdi Disla. Will change to IV rocephin due to prolonged QTc. Lactate w/in normal.   Mildy elevated WBC count without bandemia. ED attending to place transitonal orders.               [address] [facility] [place], DO  05/18/21 1296

## 2021-05-18 NOTE — FLOWSHEET NOTE
Patient arrived to room 172 via cart from ER. Ambulated into room with steady gait and independently. Oriented to room and call light. Denies any chest pain at this time. NSR/Sb on the monitor. +2 pitting edema noted to bilateral lower extremities. Pedal pulses palpable. Denies any shortness of breath at this time. Lungs are clear but diminished bilaterally. SATS 99% on 2L NC. He is A/Ox3. Blood noted on floor after her ambulated to bed. Open wound noted to the bottom of his left great toe. Measures 1cm x 1cm, has a red wound base with scant amount of sanguinous drainage noted. Wound Nurse, Malik Umaña RN, on the unit at this time and I asked her to assess it. She came and assessed the patient, site cleansed with NS and she applied a dressing of maxorb, DSD and wrapped with gauze to protect. #20g SL to right AC, flushed and patent. Vital signs stable. Call light remains in reach.  Electronically signed by Malia Hoyt RN on 5/18/2021 at 2:16 PM

## 2021-05-18 NOTE — ED NOTES
This RN assumes care of the patient. Report given by previous RN.      Kay Zarco, YING  05/18/21 3056

## 2021-05-18 NOTE — CARE COORDINATION
Definition and description of a heart attack discussed. Brief overview of the heart anatomy reviewed with pt and wife. CAD progression discussed. During a MI, lack of oxygen and damage to heart muscle explained. Symptoms of a MI reviewed. Pt. verbalizes that he/she experienced:   Post MI complications reviewed including arrhythmias, pumping problems, inflammation (pericarditis). Hospital course reviewed including testing: Lab work, B/P, ECG, ECHO, Stress testing, and Heart Cath. Treatments also reviewed from medication, angioplasty and stenting, to CABG. Patient plans to transfer to [facility] if he needs a cath. Plan post discharge includes: F/U with cardiology. Activity discussed including cardiac rehab. Pt advised to follow his physician discharge instructions for the specific amount of activity he is to be doing. Risk factors reviewed including: smoking, high cholesterol, HTN, DM, obesity, sedentary lifestyle, and stress. Pt. encouraged to make lifestyle changes to improve his health and lower these risk factors. Stress and depression were also discussed. S/S depression reviewed as well as encouragement to talk with someone if symptoms are noticed. Importance of follow up with the cardiologist reinforced. MI Zone booklet also reviewed. Goal is to keep patient in the \"green\" zone. He verbalizes understanding to call the doctor ASAP when experiencing symptoms in the \"yellow\" zone. Instructed to call 911 when S/S of \"red\" zone begin. Copy of MI booklet and zone pamphlet provided to the patient for review. Patient denies further questions at this time.    Electronically signed by Nakul Burgess RN on 5/18/2021 at 4:18 PM

## 2021-05-18 NOTE — ED PROVIDER NOTES
eMERGENCY dEPARTMENT eNCOUnter      [facility]    Chief Complaint   Patient presents with    Chest Pain       HPI    Warden Marquez is a 77 y.o. male with history of hypertension tension, type 2 diabetes, obesity, hypertrophic cardiomyopathy who presentsto ED from home  By EMS  With complaint of chest pain  Onset 2 hours  Intensity of symptoms moderate  Location of symptoms left pressure with no radiation  Patient also complains of shortness of breath. Patient's is a diabetic and blood sugar was 230. Patient's ankle is chronically swollen. PAST MEDICAL HISTORY    Past Medical History:   Diagnosis Date    Hypertension     Hypogonadism male     Type II or unspecified type diabetes mellitus without mention of complication, not stated as uncontrolled        SURGICAL HISTORY    Past Surgical History:   Procedure Laterality Date    US THYROID BIOPSY  11/24/2020    US THYROID BIOPSY 11/24/2020 MLOX RAD VASC INTERVNL       CURRENT MEDICATIONS    Current Outpatient Rx   Medication Sig Dispense Refill    metFORMIN (GLUCOPHAGE) 1000 MG tablet TAKE 1 TABLET BY MOUTH TWICE A  tablet 1    insulin regular (NOVOLIN R) 100 UNIT/ML injection INJECT 30 UNITS 3 TIMES DAILY 60 mL 3    Insulin Degludec (TRESIBA FLEXTOUCH) 200 UNIT/ML SOPN INJECT  UNITS AT BEDTIME 45 pen 3    gabapentin (NEURONTIN) 100 MG capsule TAKE 3 CAPSULES BY MOUTH 3 TIMES DAILY FOR 90 DAYS.  270 capsule 3    furosemide (LASIX) 20 MG tablet Take 20 mg by mouth daily      blood glucose test strips (ASCENSIA AUTODISC VI;ONE TOUCH ULTRA TEST VI) strip 1 each by In Vitro route 4 times daily DX: E11.65, IDDM (please dispense One Touch Verio brand) 400 each 3    Insulin Pen Needle (B-D ULTRAFINE III SHORT PEN) 31G X 8 MM MISC 3 TIMES A  each 3    Insulin Syringe-Needle U-100 (INSULIN SYRINGE .3CC/31GX5/16\") 31G X 5/16\" 0.3 ML MISC Use 3 daily with insulin 300 each 3    NIFEdipine (PROCARDIA XL) 90 MG extended release tablet Take 90 mg by mouth      fluticasone (FLONASE) 50 MCG/ACT nasal spray 1 spray by Nasal route daily      tamsulosin (FLOMAX) 0.4 MG capsule Take 0.4 mg by mouth daily.  docusate sodium (COLACE) 100 MG capsule Take 100 mg by mouth 2 times daily.  vitamin D (ERGOCALCIFEROL) 65346 UNITS CAPS capsule Take 50,000 Units by mouth once a week.  ACCU-CHEK FASTCLIX LANCETS MISC Pt test 4x daily 400 each 11    lisinopril (PRINIVIL;ZESTRIL) 20 MG tablet Take 1 tablet by mouth daily. 30 tablet 06       ALLERGIES    No Known Allergies    FAMILY HISTORY    History reviewed. No pertinent family history. SOCIAL HISTORY    Social History     Socioeconomic History    Marital status:      Spouse name: None    Number of children: None    Years of education: None    Highest education level: None   Occupational History    None   Tobacco Use    Smoking status: Former Smoker     Types: Cigarettes     Quit date: 1975     Years since quittin.4    Smokeless tobacco: Never Used   Substance and Sexual Activity    Alcohol use: None    Drug use: None    Sexual activity: None   Other Topics Concern    None   Social History Narrative    None     Social Determinants of Health     Financial Resource Strain:     Difficulty of Paying Living Expenses:    Food Insecurity:     Worried About Running Out of Food in the Last Year:     Ran Out of Food in the Last Year:    Transportation Needs:     Lack of Transportation (Medical):      Lack of Transportation (Non-Medical):    Physical Activity:     Days of Exercise per Week:     Minutes of Exercise per Session:    Stress:     Feeling of Stress :    Social Connections:     Frequency of Communication with Friends and Family:     Frequency of Social Gatherings with Friends and Family:     Attends Sikhism Services:     Active Member of Clubs or Organizations:     Attends Club or Organization Meetings:     Marital Status:    Intimate Partner Violence:     sensory function, No focal deficits noted. Psychiatric:  Affect normal, Judgment normal, Mood normal.     EKG    NSR,  , RBBB, LAFB, Normal Axis, No ST- T wave changes, QTc 526    RADIOLOGY    XR CHEST PORTABLE    (Results Pending)   CTA CHEST W WO CONTRAST - r/o Pulmonary Embolism    (Results Pending)   CT ABDOMEN PELVIS W IV CONTRAST Additional Contrast? None    (Results Pending)       REEVALUATION   Patient was updated the results of labs and Radiology. Spoke with the hospitalist accepted patient admission.       Labs  Labs Reviewed   CBC WITH AUTO DIFFERENTIAL - Abnormal; Notable for the following components:       Result Value    WBC 11.9 (*)     RBC 4.54 (*)     Hemoglobin 13.3 (*)     Hematocrit 39.8 (*)     Neutrophils Absolute 10.0 (*)     Lymphocytes Absolute 0.6 (*)     Monocytes Absolute 1.0 (*)     All other components within normal limits   COMPREHENSIVE METABOLIC PANEL W/ REFLEX TO MG FOR LOW K - Abnormal; Notable for the following components:    BUN 29 (*)     CREATININE 1.24 (*)     GFR Non- 58.1 (*)     Calcium 11.2 (*)     All other components within normal limits   TROPONIN - Abnormal; Notable for the following components:    Troponin 0.021 (*)     All other components within normal limits    Narrative:     [address] tel. [phone],  Trop results called to and read back by Dr Lizz Bautista, 05/18/2021 03:40, by  Viral Mei - Abnormal; Notable for the following components:    Protein, UA TRACE (*)     Leukocyte Esterase, Urine MODERATE (*)     All other components within normal limits   D-DIMER, QUANTITATIVE - Abnormal; Notable for the following components:    D-Dimer, Quant 0.88 (*)     All other components within normal limits    Narrative:     [address] tel. [phone],  DIMER results called to and read back by laurie santa, 05/18/2021 03:36, by  Alejandra Cochran   MICROSCOPIC URINALYSIS - Abnormal; Notable for the following components:    Bacteria, UA FEW (*) WBC, UA 20-50 (*)     All other components within normal limits   POCT VENOUS - Abnormal; Notable for the following components:    pCO2, Bello 36.6 (*)     Lactate 2.66 (*)     All other components within normal limits   COVID-19, RAPID   CULTURE, BLOOD 1   CULTURE, BLOOD 2   BRAIN NATRIURETIC PEPTIDE    Narrative:     CALL  Bar  [facility] tel. [phone],  Trop results called to and read back by Dr Isreal Cordon, 05/18/2021 03:40, by  Mahesh Forrester   PROTIME-INR   APTT   BETA-HYDROXYBUTYRATE   LACTATE, SEPSIS   LIPASE   MAGNESIUM   LACTATE, SEPSIS   LIPASE   MAGNESIUM   TROPONIN             Summation      Patient Course:     ED Medications administered this visit:    Medications   ciprofloxacin (CIPRO) IVPB 400 mg (400 mg Intravenous New Bag 5/18/21 0608)   heparin (porcine) injection 5,000 Units (has no administration in time range)   ondansetron (ZOFRAN) injection 4 mg (4 mg Intravenous Given 5/18/21 0326)   morphine (PF) injection 4 mg (4 mg Intravenous Given 5/18/21 0325)   iopamidol (ISOVUE-370) 76 % injection 100 mL (100 mLs Intravenous Given 5/18/21 0425)   aspirin chewable tablet 324 mg (324 mg Oral Given 5/18/21 0608)       New Prescriptions from this visit:    New Prescriptions    No medications on file       Follow-up:  No follow-up provider specified. Final Impression:   1. NSTEMI (non-ST elevated myocardial infarction) ([facility] [address]    2.  Acute cystitis without hematuria               (Please note that portions of this note were completed with a voice recognition program.  Efforts were made to edit the dictations but occasionally words are mis-transcribed.)          Amanda King MD  05/18/21 6599

## 2021-05-18 NOTE — FLOWSHEET NOTE
Patient returned from [address] [place] [address],[address].  Electronically signed by Tori Vazquez RN on 5/18/2021 at 2:21 PM

## 2021-05-19 ENCOUNTER — APPOINTMENT (OUTPATIENT)
Dept: CARDIAC CATH/INVASIVE PROCEDURES | Age: 66
DRG: 682 | End: 2021-05-19
Payer: MEDICARE

## 2021-05-19 VITALS
SYSTOLIC BLOOD PRESSURE: 144 MMHG | HEIGHT: 71 IN | OXYGEN SATURATION: 100 % | WEIGHT: 297 LBS | TEMPERATURE: 97.2 F | HEART RATE: 51 BPM | RESPIRATION RATE: 17 BRPM | DIASTOLIC BLOOD PRESSURE: 59 MMHG | BODY MASS INDEX: 41.58 KG/M2

## 2021-05-19 LAB
ANION GAP SERPL CALCULATED.3IONS-SCNC: 9 MEQ/L (ref 9–15)
BUN BLDV-MCNC: 22 MG/DL (ref 8–23)
CALCIUM SERPL-MCNC: 10.4 MG/DL (ref 8.5–9.9)
CHLORIDE BLD-SCNC: 105 MEQ/L (ref 95–107)
CHOLESTEROL, TOTAL: 101 MG/DL (ref 0–199)
CO2: 22 MEQ/L (ref 20–31)
CREAT SERPL-MCNC: 1.27 MG/DL (ref 0.7–1.2)
EKG ATRIAL RATE: 62 BPM
EKG P AXIS: 80 DEGREES
EKG P-R INTERVAL: 272 MS
EKG Q-T INTERVAL: 440 MS
EKG QRS DURATION: 152 MS
EKG QTC CALCULATION (BAZETT): 446 MS
EKG R AXIS: -59 DEGREES
EKG T AXIS: 41 DEGREES
EKG VENTRICULAR RATE: 62 BPM
GFR AFRICAN AMERICAN: >60
GFR NON-AFRICAN AMERICAN: 56.5
GLUCOSE BLD-MCNC: 171 MG/DL (ref 60–115)
GLUCOSE BLD-MCNC: 71 MG/DL (ref 70–99)
GLUCOSE BLD-MCNC: 74 MG/DL (ref 60–115)
HCT VFR BLD CALC: 35.6 % (ref 42–52)
HDLC SERPL-MCNC: 30 MG/DL (ref 40–59)
HEMOGLOBIN: 12 G/DL (ref 14–18)
INR BLD: 1.1
LDL CHOLESTEROL CALCULATED: 55 MG/DL (ref 0–129)
MAGNESIUM: 1.9 MG/DL (ref 1.7–2.4)
MCH RBC QN AUTO: 30.2 PG (ref 27–31.3)
MCHC RBC AUTO-ENTMCNC: 33.6 % (ref 33–37)
MCV RBC AUTO: 89.8 FL (ref 80–100)
PDW BLD-RTO: 14.2 % (ref 11.5–14.5)
PERFORMED ON: ABNORMAL
PERFORMED ON: NORMAL
PLATELET # BLD: 208 K/UL (ref 130–400)
POTASSIUM SERPL-SCNC: 4 MEQ/L (ref 3.4–4.9)
PROTHROMBIN TIME: 14.7 SEC (ref 12.3–14.9)
RBC # BLD: 3.96 M/UL (ref 4.7–6.1)
SODIUM BLD-SCNC: 136 MEQ/L (ref 135–144)
TRIGL SERPL-MCNC: 81 MG/DL (ref 0–150)
WBC # BLD: 6.7 K/UL (ref 4.8–10.8)

## 2021-05-19 PROCEDURE — 4A023N7 MEASUREMENT OF CARDIAC SAMPLING AND PRESSURE, LEFT HEART, PERCUTANEOUS APPROACH: ICD-10-PCS | Performed by: INTERNAL MEDICINE

## 2021-05-19 PROCEDURE — 6360000004 HC RX CONTRAST MEDICATION: Performed by: INTERNAL MEDICINE

## 2021-05-19 PROCEDURE — 36415 COLL VENOUS BLD VENIPUNCTURE: CPT

## 2021-05-19 PROCEDURE — 93005 ELECTROCARDIOGRAM TRACING: CPT | Performed by: INTERNAL MEDICINE

## 2021-05-19 PROCEDURE — 6360000002 HC RX W HCPCS

## 2021-05-19 PROCEDURE — 93458 L HRT ARTERY/VENTRICLE ANGIO: CPT | Performed by: INTERNAL MEDICINE

## 2021-05-19 PROCEDURE — 2580000003 HC RX 258: Performed by: INTERNAL MEDICINE

## 2021-05-19 PROCEDURE — 2709999900 HC NON-CHARGEABLE SUPPLY

## 2021-05-19 PROCEDURE — 2700000000 HC OXYGEN THERAPY PER DAY

## 2021-05-19 PROCEDURE — C1894 INTRO/SHEATH, NON-LASER: HCPCS

## 2021-05-19 PROCEDURE — C1760 CLOSURE DEV, VASC: HCPCS

## 2021-05-19 PROCEDURE — 85610 PROTHROMBIN TIME: CPT

## 2021-05-19 PROCEDURE — C1725 CATH, TRANSLUMIN NON-LASER: HCPCS

## 2021-05-19 PROCEDURE — 2500000003 HC RX 250 WO HCPCS

## 2021-05-19 PROCEDURE — 6370000000 HC RX 637 (ALT 250 FOR IP): Performed by: INTERNAL MEDICINE

## 2021-05-19 PROCEDURE — 80061 LIPID PANEL: CPT

## 2021-05-19 PROCEDURE — B2111ZZ FLUOROSCOPY OF MULTIPLE CORONARY ARTERIES USING LOW OSMOLAR CONTRAST: ICD-10-PCS | Performed by: INTERNAL MEDICINE

## 2021-05-19 PROCEDURE — C1751 CATH, INF, PER/CENT/MIDLINE: HCPCS

## 2021-05-19 PROCEDURE — 2580000003 HC RX 258

## 2021-05-19 PROCEDURE — 93010 ELECTROCARDIOGRAM REPORT: CPT | Performed by: INTERNAL MEDICINE

## 2021-05-19 PROCEDURE — 99239 HOSP IP/OBS DSCHRG MGMT >30: CPT | Performed by: INTERNAL MEDICINE

## 2021-05-19 PROCEDURE — 85027 COMPLETE CBC AUTOMATED: CPT

## 2021-05-19 PROCEDURE — 6370000000 HC RX 637 (ALT 250 FOR IP): Performed by: NURSE PRACTITIONER

## 2021-05-19 PROCEDURE — 2580000003 HC RX 258: Performed by: PHYSICIAN ASSISTANT

## 2021-05-19 PROCEDURE — 6370000000 HC RX 637 (ALT 250 FOR IP): Performed by: PHYSICIAN ASSISTANT

## 2021-05-19 PROCEDURE — C1769 GUIDE WIRE: HCPCS

## 2021-05-19 PROCEDURE — 83735 ASSAY OF MAGNESIUM: CPT

## 2021-05-19 PROCEDURE — 80048 BASIC METABOLIC PNL TOTAL CA: CPT

## 2021-05-19 RX ORDER — SODIUM CHLORIDE 9 MG/ML
INJECTION, SOLUTION INTRAVENOUS CONTINUOUS
Status: DISCONTINUED | OUTPATIENT
Start: 2021-05-19 | End: 2021-05-19 | Stop reason: HOSPADM

## 2021-05-19 RX ORDER — LABETALOL HYDROCHLORIDE 5 MG/ML
10 INJECTION, SOLUTION INTRAVENOUS EVERY 30 MIN PRN
Status: DISCONTINUED | OUTPATIENT
Start: 2021-05-19 | End: 2021-05-19 | Stop reason: HOSPADM

## 2021-05-19 RX ORDER — ACETAMINOPHEN 325 MG/1
650 TABLET ORAL EVERY 4 HOURS PRN
Status: DISCONTINUED | OUTPATIENT
Start: 2021-05-19 | End: 2021-05-19 | Stop reason: HOSPADM

## 2021-05-19 RX ORDER — SODIUM CHLORIDE 0.9 % (FLUSH) 0.9 %
5-40 SYRINGE (ML) INJECTION PRN
Status: DISCONTINUED | OUTPATIENT
Start: 2021-05-19 | End: 2021-05-19 | Stop reason: HOSPADM

## 2021-05-19 RX ORDER — ATORVASTATIN CALCIUM 80 MG/1
80 TABLET, FILM COATED ORAL NIGHTLY
Qty: 30 TABLET | Refills: 3 | Status: SHIPPED | OUTPATIENT
Start: 2021-05-19

## 2021-05-19 RX ORDER — SODIUM CHLORIDE 0.9 % (FLUSH) 0.9 %
5-40 SYRINGE (ML) INJECTION EVERY 12 HOURS SCHEDULED
Status: DISCONTINUED | OUTPATIENT
Start: 2021-05-19 | End: 2021-05-19 | Stop reason: HOSPADM

## 2021-05-19 RX ORDER — SODIUM CHLORIDE 9 MG/ML
25 INJECTION, SOLUTION INTRAVENOUS PRN
Status: DISCONTINUED | OUTPATIENT
Start: 2021-05-19 | End: 2021-05-19 | Stop reason: HOSPADM

## 2021-05-19 RX ORDER — ASPIRIN 81 MG/1
81 TABLET, CHEWABLE ORAL DAILY
Qty: 30 TABLET | Refills: 3 | Status: SHIPPED | OUTPATIENT
Start: 2021-05-20 | End: 2021-08-27

## 2021-05-19 RX ORDER — HYDRALAZINE HYDROCHLORIDE 20 MG/ML
10 INJECTION INTRAMUSCULAR; INTRAVENOUS EVERY 10 MIN PRN
Status: DISCONTINUED | OUTPATIENT
Start: 2021-05-19 | End: 2021-05-19 | Stop reason: HOSPADM

## 2021-05-19 RX ADMIN — SODIUM CHLORIDE: 9 INJECTION, SOLUTION INTRAVENOUS at 18:25

## 2021-05-19 RX ADMIN — FUROSEMIDE 20 MG: 20 TABLET ORAL at 08:42

## 2021-05-19 RX ADMIN — LISINOPRIL 40 MG: 20 TABLET ORAL at 08:42

## 2021-05-19 RX ADMIN — ASPIRIN 81 MG: 81 TABLET, CHEWABLE ORAL at 08:42

## 2021-05-19 RX ADMIN — IOPAMIDOL 57 ML: 612 INJECTION, SOLUTION INTRAVENOUS at 16:13

## 2021-05-19 RX ADMIN — Medication 5000 UNITS: at 08:42

## 2021-05-19 RX ADMIN — Medication 10 ML: at 08:42

## 2021-05-19 RX ADMIN — AMLODIPINE BESYLATE 10 MG: 10 TABLET ORAL at 08:42

## 2021-05-19 RX ADMIN — GABAPENTIN 200 MG: 100 CAPSULE ORAL at 08:42

## 2021-05-19 RX ADMIN — SODIUM CHLORIDE: 9 INJECTION, SOLUTION INTRAVENOUS at 08:41

## 2021-05-19 ASSESSMENT — PAIN SCALES - GENERAL
PAINLEVEL_OUTOF10: 0
PAINLEVEL_OUTOF10: 0

## 2021-05-19 NOTE — DISCHARGE INSTR - DIET

## 2021-05-19 NOTE — BRIEF OP NOTE
Section of Cardiology  Adult Brief Cardiac Cath Procedure Note        Procedure(s):  LHC, b/l coronary angio    Pre-operative Diagnosis:  nstemi    H&P Status: Completed and reviewed.      Post-operative Diagnosis:    LV EF of 60%  LM normal   LAD 50% mid LAD  CX mild disease  RCA mild disease    Findings:  See full report    Complications:  none    Primary Proceduralist:   Dr.Wes Nogueira DO    Plan    Med rx  rfm        Full procedure note to follow

## 2021-05-19 NOTE — PROGRESS NOTES
Outer dressing removed and pressure dressing left in place to right radial. No bleeding or hematoma.   Report called to jany on one [address] [facility] [address] and monitor attached and transport notified

## 2021-05-19 NOTE — CARE COORDINATION
[facility] Case Management Initial Discharge Assessment    Met with Patient to discuss discharge plan. PCP: Liban Hankins MD                                Date of Last Visit:  1MO    If no PCP, list provided? N/A    Discharge Planning    Living Arrangements: independently at home    Who do you live with? SPOUSE    Who helps you with your care:  self    If lives at home:     Do you have any barriers navigating in your home? no    Patient can perform ADL? Yes    Current Services (outpatient and in home) :  None    Dialysis: No    Is transportation available to get to your appointments? Yes    DME Equipment:  no    Respiratory equipment: CPAP without O2    Respiratory provider:  no     Pharmacy:  yes -  [facility]    Consult with Medication Assistance Program?  No      [facility]    Patient agreeable to SNF/Rehab? N/A    Other discharge needs identified? N/A    Initial Discharge Plan? (Note: please see concurrent daily documentation for any updates after initial note). HOME, DENIES NEEDS.      Readmission Risk              Risk of Unplanned Readmission:  15         Electronically signed by Octavio Rainey RN on 5/19/2021 at 2:07 PM

## 2021-05-19 NOTE — FLOWSHEET NOTE
Pt back from cath lab via transport. Pt's R radial site, clean, dry and intact, pulse palpable with 2+. Pt denies any pain at site. Pt to be on bred rest until 6:15.     1755 - Pt refuses lasix and flomax tonight because pt states that he will take them at home. Pt also refuses insulin for now because he has not eaten. 1845 - Pt given dinner tray. Pt educated to not take metformin until 48 hours post cardiac catheterization so 5/21 at around 1800. Pt's tele monitor and IV removed. Pt's discharge instructions given and signed by pt and RN. Pt taken via wheelchair out to meet wife.

## 2021-05-19 NOTE — PROGRESS NOTES
Department of Internal Medicine  General Internal Medicine  Attending Progress Note      SUBJECTIVE:  Pt seen and examined. Pt to have heart cath today. Wants to go home. No complaints.  Denies urinary symptoms    OBJECTIVE      Medications    Current Facility-Administered Medications: 0.9 % sodium chloride infusion, , Intravenous, Continuous  sodium chloride flush 0.9 % injection 5-40 mL, 5-40 mL, Intravenous, 2 times per day  sodium chloride flush 0.9 % injection 5-40 mL, 5-40 mL, Intravenous, PRN  0.9 % sodium chloride infusion, 25 mL, Intravenous, PRN  sodium chloride flush 0.9 % injection 5-40 mL, 5-40 mL, Intravenous, 2 times per day  sodium chloride flush 0.9 % injection 5-40 mL, 5-40 mL, Intravenous, PRN  0.9 % sodium chloride infusion, 25 mL, Intravenous, PRN  promethazine (PHENERGAN) tablet 12.5 mg, 12.5 mg, Oral, Q6H PRN **OR** ondansetron (ZOFRAN) injection 4 mg, 4 mg, Intravenous, Q6H PRN  acetaminophen (TYLENOL) tablet 650 mg, 650 mg, Oral, Q6H PRN **OR** acetaminophen (TYLENOL) suppository 650 mg, 650 mg, Rectal, Q6H PRN  polyethylene glycol (GLYCOLAX) packet 17 g, 17 g, Oral, Daily PRN  aspirin chewable tablet 81 mg, 81 mg, Oral, Daily  atorvastatin (LIPITOR) tablet 80 mg, 80 mg, Oral, Nightly  nitroGLYCERIN (NITROSTAT) SL tablet 0.4 mg, 0.4 mg, Sublingual, Q5 Min PRN  insulin regular (HUMULIN R;NOVOLIN R) injection 30 Units, 30 Units, Subcutaneous, TID AC  tamsulosin (FLOMAX) capsule 0.4 mg, 0.4 mg, Oral, Dinner  vitamin D (CHOLECALCIFEROL) capsule 5,000 Units, 5,000 Units, Oral, Daily  glucose (GLUTOSE) 40 % oral gel 15 g, 15 g, Oral, PRN  dextrose 50 % IV solution, 12.5 g, Intravenous, PRN  glucagon (rDNA) injection 1 mg, 1 mg, Intramuscular, PRN  dextrose 5 % solution, 100 mL/hr, Intravenous, PRN  insulin glargine (LANTUS) injection vial 100 Units, 100 Units, Subcutaneous, Nightly  amLODIPine (NORVASC) tablet 10 mg, 10 mg, Oral, Daily  fluticasone (FLONASE) 50 MCG/ACT nasal spray 1 spray, 1 spray, Nasal, Nightly  furosemide (LASIX) tablet 20 mg, 20 mg, Oral, BID  gabapentin (NEURONTIN) capsule 200 mg, 200 mg, Oral, BID  lisinopril (PRINIVIL;ZESTRIL) tablet 40 mg, 40 mg, Oral, Daily  [Held by provider] metFORMIN (GLUCOPHAGE) tablet 1,000 mg, 1,000 mg, Oral, BID  insulin lispro (HUMALOG) injection vial 0-12 Units, 0-12 Units, Subcutaneous, TID WC  insulin lispro (HUMALOG) injection vial 0-6 Units, 0-6 Units, Subcutaneous, Nightly  Physical    VITALS:  BP (!) 149/57   Pulse 58   Temp 99 °F (37.2 °C) (Oral)   Resp 18   Ht 5' 11\" (1.803 m)   Wt 297 lb (134.7 kg)   SpO2 97%   BMI 41.42 kg/m²   Constitutional: Awake and alert in no acute distress. Lying in bed comfortably  Head: Normocephalic, atraumatic  Eyes: EOMI, PERRLA  ENT: moist mucous membranes  Neck: neck supple, trachea midline  Lungs: Good inspiratory effort, CTABL, no wheeze, no rhonchi, no rales  Heart: RRR, normal S1 and S2  GI: Soft, non-distended, non tender, no guarding, no rebound, +BS  MSK: no edema noted  Skin: warm, dry  Psych: appropriate affect     Data    CBC:   Lab Results   Component Value Date    WBC 6.7 05/19/2021    RBC 3.96 05/19/2021    RBC 4.66 01/07/2012    HGB 12.0 05/19/2021    HCT 35.6 05/19/2021    MCV 89.8 05/19/2021    MCH 30.2 05/19/2021    MCHC 33.6 05/19/2021    RDW 14.2 05/19/2021     05/19/2021    MPV 8.0 01/07/2012     CMP:    Lab Results   Component Value Date     05/19/2021    K 4.0 05/19/2021    K 3.9 05/18/2021     05/19/2021    CO2 22 05/19/2021    BUN 22 05/19/2021    CREATININE 1.27 05/19/2021    GFRAA >60.0 05/19/2021    LABGLOM 56.5 05/19/2021    GLUCOSE 71 05/19/2021    GLUCOSE 212 02/16/2012    PROT 7.3 05/18/2021    LABALBU 4.2 05/18/2021    LABALBU 4.0 02/16/2012    CALCIUM 10.4 05/19/2021    BILITOT 0.4 05/18/2021    ALKPHOS 87 05/18/2021    AST 15 05/18/2021    ALT 13 05/18/2021       ASSESSMENT AND PLAN      1. NSTEMI II most likely;  Elevated trops likely due to accumulation 2ndry to decreased renal excretion as a result of KOLBY. Flat trend. For cardiac cath today. Admitted under cardiology service to exclude ACS or CAD. Goal K and Mg 4.0 and 2.0, respectively. If indicated, will need to replace K carefully in setting of renal insufficiency. EKG in AM. Telemetry ordered. 2. Mild KOLBY: Stable. Avoid nephrotoxic agents wherever possible. Repeat BMP in AM. Strict Intake output. Avoiding fluctuations in BP. 3. Elevated D-dimer: CTA negative for PE. Venous Doppler negative for DVT. 4. Asymptomatic bacteriuria (bilateral nonobstructing calculi):  Pain control. No need for abx as asymptomatic   5. DMII: Home regimen ordered, hypoglycemia protocol POCT Glucose TIDAC & QHS  6. Essential tremor secondary to hypercalcemia, hyperparathyroidism: Also history of adrenal adenoma (right), follows with endocrinology. Is scheduled to see ENT for parathyroidectomy May 25th.    7. BPH with bladder obstruction: Flomax ordered. Monitor for signs of urinary retention, I&Os   8. HTN: Stable. Continue home meds      Disposition: Pt medically stable. To have cardiac cath today. [id number] [address] for discharge from medical stand. Discharge per primary service.       Timothy Hill,   Internal Medicine

## 2021-05-21 NOTE — DISCHARGE SUMMARY
Cardiology Discharge Summary      Patient Identification:  Celso English  : 1955  MRN: [id number]   Account: [de-identified]     Admit date: 2021  Discharge date: 21  Attending provider: No att. providers found        Primary care provider: Kyle Menjivar MD     Admission Diagnoses:  Chest pain        Discharge Diagnoses: Active Hospital Problems    Diagnosis Date Noted    Nontransmural myocardial infarction [facility]) [I21.4] 2021    NSTEMI (non-ST elevated myocardial infarction) ([facility] [address] [I21.4] 2021    Angina at rest [facility]) [I20.8] 2021          Hospital Course:   Celso English is a75 y.o. male admitted to [facility] on 2021 for chest pain. Patient has seen when [facility] in the past.  Lately has been seeing [facility] cardiology. Needs parathyroid surgery. Needs a cardiac clearance and is having a hard time getting it done. We did a catheterization which was negative. Procedures:   Heart cath     Consults:       Examination:  BP (!) 144/59   Pulse 51   Temp 97.2 °F (36.2 °C) (Oral)   Resp 17   Ht 5' 11\" (1.803 m)   Wt 297 lb (134.7 kg)   SpO2 100%   BMI 41.42 kg/m²    Physical Exam  Constitutional:       Appearance: He is well-developed. HENT:      Head: Normocephalic and atraumatic. Right Ear: External ear normal.      Left Ear: External ear normal.   Eyes:      Conjunctiva/sclera: Conjunctivae normal.      Pupils: Pupils are equal, round, and reactive to light. Cardiovascular:      Rate and Rhythm: Normal rate and regular rhythm. Heart sounds: Normal heart sounds. Pulmonary:      Effort: Pulmonary effort is normal.      Breath sounds: Normal breath sounds. Abdominal:      General: Bowel sounds are normal.      Palpations: Abdomen is soft. Musculoskeletal:         General: Normal range of motion. Cervical back: Normal range of motion and neck supple.    Skin: General: Skin is warm and dry. Neurological:      Mental Status: He is alert and oriented to person, place, and time. Deep Tendon Reflexes: Reflexes are normal and symmetric. Psychiatric:         Behavior: Behavior normal.         Thought Content: Thought content normal.         Judgment: Judgment normal.         Medications:  Discharge Medication List as of 5/19/2021  6:57 PM      START taking these medications    Details   aspirin 81 MG chewable tablet Take 1 tablet by mouth daily, Disp-30 tablet, R-3Normal      atorvastatin (LIPITOR) 80 MG tablet Take 1 tablet by mouth nightly, Disp-30 tablet, R-3Normal         CONTINUE these medications which have NOT CHANGED    Details   gabapentin (NEURONTIN) 100 MG capsule Take 200 mg by mouth 2 times daily. Historical Med      polyethylene glycol (GLYCOLAX) 17 g packet Take 17 g by mouth daily as needed for ConstipationHistorical Med      vitamin D (CHOLECALCIFEROL) 125 MCG (5000 UT) CAPS capsule Take 5,000 Units by mouth dailyHistorical Med      amLODIPine (NORVASC) 10 MG tablet Take 10 mg by mouth dailyHistorical Med      metFORMIN (GLUCOPHAGE) 1000 MG tablet TAKE 1 TABLET BY MOUTH TWICE A DAY, Disp-180 tablet, R-1Normal      insulin regular (NOVOLIN R) 100 UNIT/ML injection INJECT 30 UNITS 3 TIMES DAILY, Disp-60 mL, R-3Normal      Insulin Degludec (TRESIBA FLEXTOUCH) 200 UNIT/ML SOPN INJECT  UNITS AT BEDTIME, Disp-45 pen, R-3Normal      furosemide (LASIX) 20 MG tablet Take 20 mg by mouth 2 times daily Historical Med      blood glucose test strips (ASCENSIA AUTODISC VI;ONE TOUCH ULTRA TEST VI) strip 4 TIMES DAILY Starting Tue 2/18/2020, Disp-400 each, R-3, NormalDX: E11.65, IDDM (please dispense One Touch Verio brand)      Insulin Pen Needle (B-D ULTRAFINE III SHORT PEN) 31G X 8 MM MISC Disp-300 each, R-3, Normal3 TIMES A DAY      Insulin Syringe-Needle U-100 (INSULIN SYRINGE .3CC/31GX5/16\") 31G X 5/16\" 0.3 ML MISC Disp-300 each, R-3, NormalUse 3 daily with insulin      fluticasone (FLONASE) 50 MCG/ACT nasal spray 1 spray by Nasal route nightly Historical Med      tamsulosin (FLOMAX) 0.4 MG capsule Take 0.4 mg by mouth Daily with supper Historical Med      ACCU-CHEK FASTCLIX LANCETS MISC Disp-400 each, R-11, PrintPt test 4x daily      lisinopril (PRINIVIL;ZESTRIL) 20 MG tablet Take 1 tablet by mouth daily. , Disp-30 tablet, R-06         STOP taking these medications       NIFEdipine (PROCARDIA XL) 90 MG extended release tablet Comments:   Reason for Stopping:         docusate sodium (COLACE) 100 MG capsule Comments:   Reason for Stopping:         vitamin D (ERGOCALCIFEROL) 02823 UNITS CAPS capsule Comments:   Reason for Stopping:               Significant Diagnostics:   Radiology: ECHO Complete 2D W Doppler W Color    Result Date: 5/18/2021  Transthoracic Echocardiography Report (TTE)  Demographics   Patient Name   [address]       Gender              Male                 [place]   Patient Number [id number]       Race                                                 Ethnicity   Visit Number   [id number]      Room Number         I808   Corporate ID                  Date of Study       05/18/2021   Accession      [id number]     Referring Physician Vladimir Navarrete   Date of Birth  1955     Sonographer         Kiko Morillo LPN   Age            77 year(s)     Interpreting        [facility]) Cardiology                                Physician           Lala Doan MD  Procedure Type of Study   TTE procedure:ECHO COMPLETE 2D W/DOP W/COLOR. Procedure Date Date: 05/18/2021 Start: 03:30 PM Study Location: Portable Technical Quality: Good visualization Indications:Myocardial infarction. Patient Status: Routine Height: 71 inches Weight: 293 pounds BSA: 2.48 m^2 BMI: 40.86 kg/m^2 BP: 180/66 mmHg  Conclusions   Summary  Normal left ventricle structure and function.   Left ventricular ejection fraction is visually estimated at 50-55%. Pseudonormal filling pattern noted. Signature   ----------------------------------------------------------------  Electronically signed by Lala Doan MD(Interpreting  physician) on 05/18/2021 04:18 PM  ----------------------------------------------------------------   Findings  Left Ventricle Normal left ventricle structure and function. Left ventricular ejection fraction is visually estimated at 50-55%. Pseudonormal filling pattern noted. Right Ventricle Normal right ventricle structure and function. Normal right ventricle systolic pressure. Left Atrium Normal left atrium. Right Atrium Normal right atrium. Mitral Valve Normal mitral valve structure and function. Tricuspid Valve Normal tricuspid valve structure and function. Trivial TR Aortic Valve Normal aortic valve structure and function. Pulmonic Valve The pulmonic valve was not well visualized . Pericardial Effusion No evidence of pericardial effusion. Pleural Effusion No evidence of pleural effusion. Aorta \ Miscellaneous The aorta is within normal limits. M-Mode Measurements (cm)   LVIDd: 4.96 cm                         LVIDs: 3.93 cm  IVSd: 1.21 cm                          IVSs: 1.58 cm  LVPWd: 1.31 cm                         LVPWs: 1.54 cm  Rt. Vent.  Dimension: 1.91 cm           AO Root Dimension: 3.45 cm                                         ACS: 2.3 cm                                         LA: 5.29 cm                                         LVOT: 2.12 cm  Doppler Measurements:   AV Velocity:0.02 m/s                   MV Peak E-Wave: 0.95 m/s  AV Peak Gradient: 7.04 mmHg            MV Peak A-Wave: 0.78 m/s  AV Mean Gradient: 3.47 mmHg  AV Area (Continuity):2.38 cm^2         MV P1/2t: 75.2 msec  TR Velocity:1.59 m/s                   MVA by PHT2.93 cm^2  TR Gradient:10.11 mmHg  Valves  Mitral Valve   Peak E-Wave: 0.95 m/s           Peak A-Wave: 0.78 m/s  P1/2t: 75.2 msec                E/A Ratio: 1.22 Peak Gradient: 3.65 mmHg  Area (PHT): 2.93 cm^2           Deceleration Time: 246.3 msec   Aortic Valve   Peak Velocity: 1.33 m/s                Mean Velocity: 0.85 m/s  Peak Gradient: 7.04 mmHg               Mean Gradient: 3.47 mmHg  Area (continuity): 2.38 cm^2  AV VTI: 27.79 cm   Cusp Separation: 2.3 cm   Tricuspid Valve   TR Velocity: 1.59 m/s              TR Gradient: 10.11 mmHg   Pulmonic Valve   Peak Velocity: 1.11 m/s             Peak Gradient: 4.89 mmHg   LVOT   Peak Velocity: 0.9 m/s               Mean Velocity: 0.55 m/s  Peak Gradient: 3.15 mmHg             Mean Gradient: 1.46 mmHg  LVOT Diameter: 2.12 cm               LVOT VTI: 18.75 cm  Structures  Left Atrium   LA Dimension: 5.29 cm                         LA Area: 25.4 cm^2  LA/Aorta: 1.53  LA Volume/Index: 89.58 ml /36 m^2   Left Ventricle   Diastolic Dimension: 8.29 cm         Systolic Dimension: 2.59 cm  Septum Diastolic: 5.04 cm            Septum Systolic: 1.76 cm  PW Diastolic: 6.12 cm                PW Systolic: 6.22 cm                                       FS: 20.8 %  LV EDV/LV EDV Index: 116.3 ml/47 m^2 LV ESV/LV ESV Index: 67.13 ml/27 m^2  EF Calculated: 42.3 %   LVOT Diameter: 2.12 cm   Right Ventricle   Diastolic Dimension: 1.72 cm  Aorta/ Miscellaneous Aorta   Aortic Root: 3.45 cm  LVOT Diameter: 2.12 cm      CTA CHEST W WO CONTRAST - r/o Pulmonary Embolism    Result Date: 5/18/2021  EXAM: CTA CHEST W WO CONTRAST History: Chest pain Technique: Multiple contiguous axial images of the chest were obtained from the thoracic inlet through the upper abdomen with contrast. Multiplanar reformats including maximum intensity projection images were obtained. Comparison: CT chest October 17, 2020 Findings: Small bilateral thyroid nodules are not changed from prior examination. No axillary, mediastinal, or hilar lymphadenopathy. No thoracic aortic aneurysm or dissection. No filling defect within the pulmonary arteries.  Heart size is within normal limits. Evidence of coronary artery disease. No significant pericardial effusion. Esophagus is within normal limits. No pulmonary nodule or mass. No consolidation, pleural effusion, or pneumothorax. Minimal bilateral dependent subsegmental atelectasis. No acute osseous abnormality. Diffuse intrahepatic skeletal hyperostosis again identified. Visualized upper abdomen demonstrates no acute abnormality. No pulmonary embolism or acute intrathoracic process. All CT scans at this facility use dose modulation, iterative reconstruction, and/or weight based dosing when appropriate to reduce radiation dose to as low as reasonably achievable. CT ABDOMEN PELVIS W IV CONTRAST Additional Contrast? None    Result Date: 5/18/2021  EXAM:  CT ABDOMEN PELVIS W IV CONTRAST History: Abdominal pain Technique: Multiple contiguous axial images were obtained of the abdomen and pelvis from an level of the lung bases through the ischial tuberosities with IV contrast. Multiplanar reformats were obtained. Delayed images were obtained. Comparison: CT abdomen pelvis February 21, 2018 Findings: Lung bases are clear. A significant interval change of a right adrenal nodule measuring approximately 3.5 cm. The stomach, spleen, pancreas, left adrenal gland, and liver are within normal limits. The gallbladder is mildly distended but otherwise unremarkable. The kidneys enhance uniformly. Unchanged small right kidney cyst. Bilateral renal calculi are again identified, largest on the right measures approximately 3 mm and the largest on the left measures approximately 9 mm. No hydronephrosis. The urinary bladder is well distended. The prostate is enlarged. Abdominal aorta is nonaneurysmal  . No retroperitoneal or abdominal/pelvic lymphadenopathy. No small bowel obstruction. No overt colonic mass or pericolonic inflammation. Stool is seen throughout the colon. No findings of acute appendicitis.  No free fluid, loculated fluid collection, or pneumoperitoneum. No acute osseous abnormality. Degenerative changes of the lumbar spine. Nonspecific mild distention of the gallbladder. No radiopaque gallstones, gallbladder wall thickening, or pericholecystic fluid. No significant interval change of a 3.5 cm right adrenal nodule. Bilateral nonobstructing renal calculi. Stool is seen throughout the colon may represent constipation. The prostate is enlarged. All CT scans at this facility use dose modulation, iterative reconstruction, and/or weight based dosing when appropriate to reduce radiation dose to as low as reasonably achievable. XR CHEST PORTABLE    Result Date: 5/18/2021  EXAMINATION: XR CHEST PORTABLE CLINICAL HISTORY: CHEST PAIN COMPARISONS: CT CHEST, OCTOBER 17, 2020 FINDINGS: Osseous structures are intact. Cardiopericardial silhouette is normal. Pulmonary vasculature is normal. Lungs are clear. NO ACUTE CARDIOPULMONARY DISEASE.    US DUP LOWER EXTREMITIES BILATERAL VENOUS    Result Date: 5/18/2021  EXAMINATION: US DUP LOWER EXTREMITIES BILATERAL VENOUS CLINICAL HISTORY: 77year-old with leg edema and cramping. COMPARISONS: None available. FINDINGS: Duplex and color Doppler ultrasounds were performed of the bilateral lower extremity deep venous systems. Visualized portions of both common femoral veins, femoral veins, and popliteal veins demonstrate satisfactory compression, color flow, and  augmentation. Segmental views of posterior tibial and peroneal deep calf veins demonstrate satisfactory compression and color flow.  Deep calf veins are incompletely imaged in their entirety     NO ULTRASOUND SIGNS OF THROMBUS IN THE BILATERAL LOWER EXTREMITY DEEP VENOUS SYSTEMS AS DETAILED ABOVE      Labs:   Recent Results (from the past 72 hour(s))   Troponin    Collection Time: 05/18/21  2:53 PM   Result Value Ref Range    Troponin 0.017 (HH) 0.000 - 0.010 ng/mL   Hemoglobin A1c    Collection Time: 05/18/21  2:53 PM   Result Value Ref Range    Hemoglobin A1C 6.6 (H) 4.8 - 5.9 %   POCT Glucose    Collection Time: 05/18/21  3:59 PM   Result Value Ref Range    POC Glucose 135 (H) 60 - 115 mg/dl    Performed on ACCU-CHEK    POCT Glucose    Collection Time: 05/18/21  7:57 PM   Result Value Ref Range    POC Glucose 162 (H) 60 - 115 mg/dl    Performed on ACCU-CHEK    Troponin    Collection Time: 05/18/21  8:13 PM   Result Value Ref Range    Troponin 0.016 (HH) 0.000 - 0.010 ng/mL   EKG 12 lead    Collection Time: 05/19/21 12:33 AM   Result Value Ref Range    Ventricular Rate 62 BPM    Atrial Rate 62 BPM    P-R Interval 272 ms    QRS Duration 152 ms    Q-T Interval 440 ms    QTc Calculation (Bazett) 446 ms    P Axis 80 degrees    R Axis -59 degrees    T Axis 41 degrees   POCT Glucose    Collection Time: 05/19/21  5:53 AM   Result Value Ref Range    POC Glucose 74 60 - 115 mg/dl    Performed on ACCU-CHEK    Magnesium    Collection Time: 05/19/21  5:57 AM   Result Value Ref Range    Magnesium 1.9 1.7 - 2.4 mg/dL   Lipid panel - fasting    Collection Time: 05/19/21  5:57 AM   Result Value Ref Range    Cholesterol, Total 101 0 - 199 mg/dL    Triglycerides 81 0 - 150 mg/dL    HDL 30 (L) 40 - 59 mg/dL    LDL Calculated 55 0 - 129 mg/dL   CBC    Collection Time: 05/19/21  5:57 AM   Result Value Ref Range    WBC 6.7 4.8 - 10.8 K/uL    RBC 3.96 (L) 4.70 - 6.10 M/uL    Hemoglobin 12.0 (L) 14.0 - 18.0 g/dL    Hematocrit 35.6 (L) 42.0 - 52.0 %    MCV 89.8 80.0 - 100.0 fL    MCH 30.2 27.0 - 31.3 pg    MCHC 33.6 33.0 - 37.0 %    RDW 14.2 11.5 - 14.5 %    Platelets 898 473 - 017 K/uL   BASIC METABOLIC PANEL    Collection Time: 05/19/21  5:57 AM   Result Value Ref Range    Sodium 136 135 - 144 mEq/L    Potassium 4.0 3.4 - 4.9 mEq/L    Chloride 105 95 - 107 mEq/L    CO2 22 20 - 31 mEq/L    Anion Gap 9 9 - 15 mEq/L    Glucose 71 70 - 99 mg/dL    BUN 22 8 - 23 mg/dL    CREATININE 1.27 (H) 0.70 - 1.20 mg/dL    GFR Non-African American 56.5 (L) >60    GFR  >60.0 >60    Calcium 10. 4 (H) 8.5 - 9.9 mg/dL   Protime-INR    Collection Time: 05/19/21  5:57 AM   Result Value Ref Range    Protime 14.7 12.3 - 14.9 sec    INR 1.1    POCT Glucose    Collection Time: 05/19/21 11:04 AM   Result Value Ref Range    POC Glucose 171 (H) 60 - 115 mg/dl    Performed on ACCU-CHEK              Follow-up visits:   Juan Carlos Craig MD  [address] [facility] [address] [place] [id number] [phone]    Schedule an appointment as soon as possible for a visit in 4 weeks  Post procedure follow up., Post hospitalization follow up    Harpal Smalls, [address] [address] [phone]    On 5/24/2021  at 2:35pm       Assessment:  Active Hospital Problems    Diagnosis Date Noted    Nontransmural myocardial infarction [facility]) [I21.4] 05/18/2021    NSTEMI (non-ST elevated myocardial infarction) ([facility] [address] [I21.4] 05/18/2021    Angina at rest [facility]) [I20.8] 05/18/2021         Plan:   1. Discharge home  2.   See me as outpatient so he can be cleared for surgery        Electronically signed by Juan Carlos Craig, Is[facility] 5/21/2021 at 12:28 PM

## 2021-05-23 LAB
BLOOD CULTURE, ROUTINE: NORMAL
CULTURE, BLOOD 2: NORMAL

## 2021-05-24 ENCOUNTER — OFFICE VISIT (OUTPATIENT)
Dept: CARDIOLOGY CLINIC | Age: 66
End: 2021-05-24
Payer: MEDICARE

## 2021-05-24 VITALS
HEIGHT: 71 IN | SYSTOLIC BLOOD PRESSURE: 142 MMHG | BODY MASS INDEX: 41.86 KG/M2 | HEART RATE: 47 BPM | DIASTOLIC BLOOD PRESSURE: 64 MMHG | WEIGHT: 299 LBS | OXYGEN SATURATION: 96 %

## 2021-05-24 DIAGNOSIS — Z01.810 PREOP CARDIOVASCULAR EXAM: Primary | ICD-10-CM

## 2021-05-24 PROCEDURE — 99214 OFFICE O/P EST MOD 30 MIN: CPT | Performed by: INTERNAL MEDICINE

## 2021-05-24 RX ORDER — CETIRIZINE HYDROCHLORIDE 10 MG/1
10 TABLET ORAL DAILY
COMMUNITY

## 2021-05-24 RX ORDER — CIPROFLOXACIN 250 MG/1
250 TABLET, FILM COATED ORAL 2 TIMES DAILY
Qty: 10 TABLET | Refills: 0 | Status: SHIPPED | OUTPATIENT
Start: 2021-05-24 | End: 2021-05-29

## 2021-05-24 ASSESSMENT — ENCOUNTER SYMPTOMS
APNEA: 0
WHEEZING: 0
VOMITING: 0
BLOOD IN STOOL: 0
NAUSEA: 0
TROUBLE SWALLOWING: 0
CHEST TIGHTNESS: 0
SHORTNESS OF BREATH: 0
VOICE CHANGE: 0
COLOR CHANGE: 0
FACIAL SWELLING: 0
ABDOMINAL DISTENTION: 0
ANAL BLEEDING: 0

## 2021-05-24 NOTE — LETTER
[facility] Cardiology  [address] [facility] [place] [address]  Phone: [phone]  Fax: Val Ambriz MD        May 24, 2021     Patient: Dao Vogt   YOB: 1955   Date of Visit: 5/24/2021       To Whom It May Concern: It is my medical opinion that Dao Mitnuzhat is acceptable risk for surgery. If you have any questions or concerns, please don't hesitate to call.     Sincerely,        Jevon Taylor MD

## 2021-05-24 NOTE — PROGRESS NOTES
[facility] CARDIOLOGY OFFICE FOLLOW-UP      Patient: Yung French  YOB: 1955  MRN: [id number]    Chief Complaint:  Chief Complaint   Patient presents with    Follow Up After Procedure     cath done    Cardiac Clearance     Pituatiary gland removal at [address] [place] [address] Bradycardia         Subjective/HPI:  21: Patient presents today for follow-up of recent admission to [address] [place] [address] with chest pain. He used to see Dr. Swetha Laguerre. Lately seen Dr Ana María Fuentes does not appear to be too happy. He needs parathyroid surgery done. Because of some chest pain we decided to do a cardiac catheterization which was negative. Patient was discharged home on going to give him a letter to give to Dr. Víctor Vergara at [facility]. He is acceptable risk for surgery. He is to see me in few weeks he wants to be set up with the [address] [place] [address] system for primary care           Past Medical History:   Diagnosis Date    Hypertension     Hypogonadism male     Type II or unspecified type diabetes mellitus without mention of complication, not stated as uncontrolled        Past Surgical History:   Procedure Laterality Date    US THYROID BIOPSY  2020     THYROID BIOPSY 2020 MLOX RAD VASC INTERVNL       No family history on file.     Social History     Socioeconomic History    Marital status:      Spouse name: Not on file    Number of children: Not on file    Years of education: Not on file    Highest education level: Not on file   Occupational History    Not on file   Tobacco Use    Smoking status: Former Smoker     Types: Cigarettes     Quit date: 1975     Years since quittin.4    Smokeless tobacco: Never Used   Substance and Sexual Activity    Alcohol use: Not on file    Drug use: Not on file    Sexual activity: Not on file   Other Topics Concern    Not on file   Social History Narrative    Not on file     Social Determinants of Health     Financial Resource Strain:     Difficulty of Paying Living Expenses:    Food Insecurity:     Worried About Running Out of Food in the Last Year:     [address] in the Last Year:    Transportation Needs:     Lack of Transportation (Medical):  Lack of Transportation (Non-Medical):    Physical Activity:     Days of Exercise per Week:     Minutes of Exercise per Session:    Stress:     Feeling of Stress :    Social Connections:     Frequency of Communication with Friends and Family:     Frequency of Social Gatherings with Friends and Family:     Attends [employer] Services:     Active Member of Clubs or Organizations:     Attends Club or Organization Meetings:     Marital Status:    Intimate Partner Violence:     Fear of Current or Ex-Partner:     Emotionally Abused:     Physically Abused:     Sexually Abused:        No Known Allergies    Current Outpatient Medications   Medication Sig Dispense Refill    cetirizine (ZYRTEC) 10 MG tablet Take 10 mg by mouth daily      aspirin 81 MG chewable tablet Take 1 tablet by mouth daily 30 tablet 3    atorvastatin (LIPITOR) 80 MG tablet Take 1 tablet by mouth nightly 30 tablet 3    gabapentin (NEURONTIN) 100 MG capsule Take 200 mg by mouth 2 times daily.       polyethylene glycol (GLYCOLAX) 17 g packet Take 17 g by mouth daily as needed for Constipation      vitamin D (CHOLECALCIFEROL) 125 MCG (5000 UT) CAPS capsule Take 5,000 Units by mouth daily      amLODIPine (NORVASC) 10 MG tablet Take 10 mg by mouth daily      metFORMIN (GLUCOPHAGE) 1000 MG tablet TAKE 1 TABLET BY MOUTH TWICE A  tablet 1    insulin regular (NOVOLIN R) 100 UNIT/ML injection INJECT 30 UNITS 3 TIMES DAILY (Patient taking differently: 30 Units 3 times daily (before meals) INJECT 30 UNITS 3 TIMES DAILY) 60 mL 3    Insulin Degludec (TRESIBA FLEXTOUCH) 200 UNIT/ML SOPN INJECT  UNITS AT BEDTIME (Patient taking differently: Inject 100 Units into the skin nightly INJECT  UNITS AT BEDTIME) 45 pen 3    furosemide (LASIX) 20 MG Psychiatric/Behavioral: Negative for agitation, behavioral problems, confusion, hallucinations and suicidal ideas. The patient is not nervous/anxious. All other systems reviewed and are negative. Review of System is negative except for as mentioned above. Physical Examination:    BP (!) 142/64 (Site: Right Upper Arm, Position: Sitting, Cuff Size: Large Adult)   Pulse (!) 47   Ht 5' 11\" (1.803 m)   Wt 299 lb (135.6 kg)   SpO2 96%   BMI 41.70 kg/m²    Physical Exam   Constitutional: He appears healthy. No distress. HENT:   Nose: Nose normal.   Mouth/Throat: Dentition is normal. Oropharynx is clear. Eyes: Pupils are equal, round, and reactive to light. Conjunctivae are normal.   Neck: Thyroid normal.   Cardiovascular: Regular rhythm, S1 normal, S2 normal, normal heart sounds, intact distal pulses and normal pulses. PMI is not displaced. No murmur heard. Pulmonary/Chest: He has no wheezes. He has no rales. He exhibits no tenderness. Abdominal: Soft. Bowel sounds are normal. He exhibits no distension and no mass. There is no splenomegaly or hepatomegaly. There is no abdominal tenderness. No hernia. Musculoskeletal:      Cervical back: Normal range of motion and neck supple. Neurological: He is alert and oriented to person, place, and time. He has normal motor skills. Gait normal.   Skin: Skin is warm and dry. No cyanosis. No jaundice. Nails show no clubbing.            Patient Active Problem List   Diagnosis    Type II diabetes mellitus, uncontrolled ([address]    Hypogonadism male    Hypertension    Obesity    Cardiomyopathy due to hypertension, without heart failure (HCC)    BAKER (dyspnea on exertion)    Hypertrophic nonobstructive cardiomyopathy (HCC)    Syncope and collapse    Abnormal EKG    Hypercalcemia    Tremor    Right thyroid nodule    Abnormal ultrasound of thyroid gland    Nontransmural myocardial infarction ([address]    NSTEMI (non-ST elevated myocardial infarction) [facility]    Angina at rest [facility])           No orders of the defined types were placed in this encounter. No orders of the defined types were placed in this encounter. Assessment/Orders:       ICD-10-CM    1. Preop cardiovascular exam  Z01.810        No orders of the defined types were placed in this encounter. There are no discontinued medications. No orders of the defined types were placed in this encounter. Plan:  Acceptable risk for surgery with Dr. Linnette Oconnell    Stay on same medications.     See me in 1 month        Electronically signed by: Corrie Easley MD  5/25/2021 8:55 AM

## 2021-06-02 ENCOUNTER — TELEPHONE (OUTPATIENT)
Dept: CARDIOLOGY CLINIC | Age: 66
End: 2021-06-02

## 2021-06-02 NOTE — TELEPHONE ENCOUNTER
Patient calling states he was admitted from ER and  had a heart cath on 5/18/2021 with Dr Lance Danielle and received a letter from his insurance stating was denied.  Hospital billing number given to patient

## 2021-08-09 ENCOUNTER — TELEPHONE (OUTPATIENT)
Dept: ENDOCRINOLOGY | Age: 66
End: 2021-08-09

## 2021-08-09 NOTE — TELEPHONE ENCOUNTER
Jose Ramon Armijo from [employer] called in to ask a question about the patient's medication to help him get covered.     She would like to know is he stable on the [employer] solo [employer], she has faxed over a form with the questions

## 2021-08-29 RX ORDER — ASPIRIN 81 MG
TABLET,CHEWABLE ORAL
Qty: 30 TABLET | Refills: 11 | Status: SHIPPED | OUTPATIENT
Start: 2021-08-29 | End: 2022-09-13

## 2021-10-27 DIAGNOSIS — Z79.4 TYPE 2 DIABETES MELLITUS WITHOUT COMPLICATION, WITH LONG-TERM CURRENT USE OF INSULIN (HCC): ICD-10-CM

## 2021-10-27 DIAGNOSIS — E11.9 TYPE 2 DIABETES MELLITUS WITHOUT COMPLICATION, WITH LONG-TERM CURRENT USE OF INSULIN (HCC): ICD-10-CM

## 2021-10-27 DIAGNOSIS — I15.9 SECONDARY HYPERTENSION: ICD-10-CM

## 2021-10-27 RX ORDER — PEN NEEDLE, DIABETIC 31 GX5/16"
NEEDLE, DISPOSABLE MISCELLANEOUS
Qty: 100 EACH | Refills: 11 | Status: SHIPPED | OUTPATIENT
Start: 2021-10-27

## 2021-10-27 NOTE — TELEPHONE ENCOUNTER
Pharmacy  requesting medication refill. Please approve or deny this request.    Rx requested:  Requested Prescriptions     Pending Prescriptions Disp Refills    Insulin Pen Needle (B-D ULTRAFINE III SHORT PEN) 31G X 8 MM MISC [Pharmacy Med Name: BD UF SHORT PEN NEEDLE [id number]] 100 each 11     Sig: USE THREE TIMES DAILY WITH INSULIN         Last Office Visit:   4/16/2021      Next Visit Date:  No future appointments.

## 2022-03-29 RX ORDER — GABAPENTIN 100 MG/1
CAPSULE ORAL
Qty: 270 CAPSULE | Refills: 3 | OUTPATIENT
Start: 2022-03-29

## 2022-09-12 NOTE — TELEPHONE ENCOUNTER
Provider Specialty Visit Type Primary Dx   05/24/2021 Cain Marrero MD Cardiology Office Visit Preop cardiovascular exam
Never smoker

## 2022-09-13 RX ORDER — ASPIRIN 81 MG
TABLET,CHEWABLE ORAL
Qty: 30 TABLET | Refills: 11 | Status: SHIPPED | OUTPATIENT
Start: 2022-09-13

## 2022-09-25 ENCOUNTER — APPOINTMENT (OUTPATIENT)
Dept: GENERAL RADIOLOGY | Age: 67
End: 2022-09-25
Payer: MEDICARE

## 2022-09-25 ENCOUNTER — HOSPITAL ENCOUNTER (EMERGENCY)
Age: 67
Discharge: ANOTHER HOSPITAL | End: 2022-09-25
Payer: MEDICARE

## 2022-09-25 ENCOUNTER — APPOINTMENT (OUTPATIENT)
Dept: CT IMAGING | Age: 67
End: 2022-09-25
Payer: MEDICARE

## 2022-09-25 VITALS
SYSTOLIC BLOOD PRESSURE: 167 MMHG | TEMPERATURE: 98.4 F | BODY MASS INDEX: 37.25 KG/M2 | HEIGHT: 72 IN | HEART RATE: 67 BPM | RESPIRATION RATE: 14 BRPM | DIASTOLIC BLOOD PRESSURE: 59 MMHG | OXYGEN SATURATION: 97 % | WEIGHT: 275 LBS

## 2022-09-25 DIAGNOSIS — R55 SYNCOPE AND COLLAPSE: Primary | ICD-10-CM

## 2022-09-25 DIAGNOSIS — I44.2 THIRD DEGREE HEART BLOCK (HCC): ICD-10-CM

## 2022-09-25 DIAGNOSIS — S06.5XAA SUBDURAL HEMATOMA: ICD-10-CM

## 2022-09-25 LAB
ALBUMIN SERPL-MCNC: 4.3 G/DL (ref 3.5–4.6)
ALP BLD-CCNC: 79 U/L (ref 35–104)
ALT SERPL-CCNC: 11 U/L (ref 0–41)
ANION GAP SERPL CALCULATED.3IONS-SCNC: 11 MEQ/L (ref 9–15)
APTT: 28.5 SEC (ref 24.4–36.8)
AST SERPL-CCNC: 11 U/L (ref 0–40)
BASOPHILS ABSOLUTE: 0.1 K/UL (ref 0–0.2)
BASOPHILS RELATIVE PERCENT: 1.1 %
BILIRUB SERPL-MCNC: 0.4 MG/DL (ref 0.2–0.7)
BILIRUBIN URINE: NEGATIVE
BLOOD, URINE: NEGATIVE
BUN BLDV-MCNC: 34 MG/DL (ref 8–23)
CALCIUM SERPL-MCNC: 9.3 MG/DL (ref 8.5–9.9)
CHLORIDE BLD-SCNC: 101 MEQ/L (ref 95–107)
CLARITY: CLEAR
CO2: 24 MEQ/L (ref 20–31)
COLOR: YELLOW
CREAT SERPL-MCNC: 1.36 MG/DL (ref 0.7–1.2)
EOSINOPHILS ABSOLUTE: 0.2 K/UL (ref 0–0.7)
EOSINOPHILS RELATIVE PERCENT: 2.1 %
GFR AFRICAN AMERICAN: >60
GFR NON-AFRICAN AMERICAN: 52
GLOBULIN: 3.3 G/DL (ref 2.3–3.5)
GLUCOSE BLD-MCNC: 123 MG/DL (ref 70–99)
GLUCOSE URINE: >=1000 MG/DL
HCT VFR BLD CALC: 44.3 % (ref 42–52)
HEMOGLOBIN: 14.8 G/DL (ref 14–18)
INR BLD: 1
KETONES, URINE: NEGATIVE MG/DL
LACTIC ACID: 1.6 MMOL/L (ref 0.5–2.2)
LEUKOCYTE ESTERASE, URINE: NEGATIVE
LYMPHOCYTES ABSOLUTE: 1.6 K/UL (ref 1–4.8)
LYMPHOCYTES RELATIVE PERCENT: 19.3 %
MAGNESIUM: 2.4 MG/DL (ref 1.7–2.4)
MCH RBC QN AUTO: 29.5 PG (ref 27–31.3)
MCHC RBC AUTO-ENTMCNC: 33.4 % (ref 33–37)
MCV RBC AUTO: 88.3 FL (ref 80–100)
MONOCYTES ABSOLUTE: 0.8 K/UL (ref 0.2–0.8)
MONOCYTES RELATIVE PERCENT: 9.8 %
NEUTROPHILS ABSOLUTE: 5.6 K/UL (ref 1.4–6.5)
NEUTROPHILS RELATIVE PERCENT: 67.7 %
NITRITE, URINE: NEGATIVE
PDW BLD-RTO: 15.5 % (ref 11.5–14.5)
PH UA: 7 (ref 5–9)
PLATELET # BLD: 241 K/UL (ref 130–400)
POC CREATININE WHOLE BLOOD: 1.4
POTASSIUM SERPL-SCNC: 4.5 MEQ/L (ref 3.4–4.9)
PRO-BNP: 353 PG/ML
PROTEIN UA: NEGATIVE MG/DL
PROTHROMBIN TIME: 13.6 SEC (ref 12.3–14.9)
RBC # BLD: 5.01 M/UL (ref 4.7–6.1)
SODIUM BLD-SCNC: 136 MEQ/L (ref 135–144)
SPECIFIC GRAVITY UA: 1.02 (ref 1–1.03)
T4 FREE: 1.21 NG/DL (ref 0.84–1.68)
TOTAL CK: 112 U/L (ref 0–190)
TOTAL PROTEIN: 7.6 G/DL (ref 6.3–8)
TROPONIN: 0.01 NG/ML (ref 0–0.01)
TSH REFLEX: 4.38 UIU/ML (ref 0.44–3.86)
URINE REFLEX TO CULTURE: ABNORMAL
UROBILINOGEN, URINE: 0.2 E.U./DL
WBC # BLD: 8.3 K/UL (ref 4.8–10.8)

## 2022-09-25 PROCEDURE — 72128 CT CHEST SPINE W/O DYE: CPT

## 2022-09-25 PROCEDURE — 85730 THROMBOPLASTIN TIME PARTIAL: CPT

## 2022-09-25 PROCEDURE — 80053 COMPREHEN METABOLIC PANEL: CPT

## 2022-09-25 PROCEDURE — 6830039000 HC L3 TRAUMA ALERT

## 2022-09-25 PROCEDURE — 83605 ASSAY OF LACTIC ACID: CPT

## 2022-09-25 PROCEDURE — 71045 X-RAY EXAM CHEST 1 VIEW: CPT

## 2022-09-25 PROCEDURE — 82550 ASSAY OF CK (CPK): CPT

## 2022-09-25 PROCEDURE — 83880 ASSAY OF NATRIURETIC PEPTIDE: CPT

## 2022-09-25 PROCEDURE — 70450 CT HEAD/BRAIN W/O DYE: CPT

## 2022-09-25 PROCEDURE — 85025 COMPLETE CBC W/AUTO DIFF WBC: CPT

## 2022-09-25 PROCEDURE — 83735 ASSAY OF MAGNESIUM: CPT

## 2022-09-25 PROCEDURE — 85610 PROTHROMBIN TIME: CPT

## 2022-09-25 PROCEDURE — 84443 ASSAY THYROID STIM HORMONE: CPT

## 2022-09-25 PROCEDURE — 71260 CT THORAX DX C+: CPT

## 2022-09-25 PROCEDURE — 74177 CT ABD & PELVIS W/CONTRAST: CPT

## 2022-09-25 PROCEDURE — 72125 CT NECK SPINE W/O DYE: CPT

## 2022-09-25 PROCEDURE — 84484 ASSAY OF TROPONIN QUANT: CPT

## 2022-09-25 PROCEDURE — 6360000004 HC RX CONTRAST MEDICATION

## 2022-09-25 PROCEDURE — 84439 ASSAY OF FREE THYROXINE: CPT

## 2022-09-25 PROCEDURE — 72131 CT LUMBAR SPINE W/O DYE: CPT

## 2022-09-25 PROCEDURE — 81003 URINALYSIS AUTO W/O SCOPE: CPT

## 2022-09-25 PROCEDURE — 93005 ELECTROCARDIOGRAM TRACING: CPT

## 2022-09-25 PROCEDURE — 36415 COLL VENOUS BLD VENIPUNCTURE: CPT

## 2022-09-25 PROCEDURE — 99285 EMERGENCY DEPT VISIT HI MDM: CPT

## 2022-09-25 RX ADMIN — IOPAMIDOL 75 ML: 612 INJECTION, SOLUTION INTRAVENOUS at 19:39

## 2022-09-25 ASSESSMENT — ENCOUNTER SYMPTOMS
PHOTOPHOBIA: 0
NAUSEA: 0
DIARRHEA: 0
EYE PAIN: 0
SHORTNESS OF BREATH: 0
ABDOMINAL PAIN: 0
FACIAL SWELLING: 0
BACK PAIN: 1
VOMITING: 0
COUGH: 0

## 2022-09-25 ASSESSMENT — PAIN - FUNCTIONAL ASSESSMENT: PAIN_FUNCTIONAL_ASSESSMENT: 0-10

## 2022-09-25 ASSESSMENT — PAIN DESCRIPTION - LOCATION
LOCATION: HEAD
LOCATION: HEAD

## 2022-09-25 ASSESSMENT — PAIN DESCRIPTION - PAIN TYPE
TYPE: ACUTE PAIN
TYPE: ACUTE PAIN

## 2022-09-25 ASSESSMENT — PAIN DESCRIPTION - ORIENTATION: ORIENTATION: ANTERIOR;POSTERIOR

## 2022-09-25 NOTE — ED PROVIDER NOTES
[address] [facility] ED  eMERGENCY dEPARTMENT eNCOUnter      Pt Name: Edna Araujo  MRN: [id number]  Armssugeygfdeandra 1955  Date of evaluation: 9/25/2022  Provider: YADIEL Mcarthur        HISTORY OF PRESENT ILLNESS    Edna Araujo is a 74 y.o. male per chart review has ah/o T2DM, syncope, NSTEMI, HTN, cardiomyopathy. Patient presents to the emergency department for 2 syncopal episodes today. Patient hit his head both times, initially on his forehead, second time hit the back of his head. He states at present he feels lightheaded, dizzy, has neck and back pain from the fall. He is pale and diaphoretic. Presents bradycardic. Presents in third-degree heart block. Denies history of 3rd degree heart block but does have history of multiple syncopal episodes, he states they occur every couple months, he states he has had numerous tests done and no etiology has yet been found. Follows with Dr. Faisal Mae cardiology at [facility]. No recent illnesses. No dyspnea or chest pain at present. No headache, arthralgias, eye pain or visual disturbance. Chart review notable for cardiac cath 5/19/2021 EF 55% normal LV function. EKG with history of 1st degree AVB no 3rd degree noted. Normal cardiac PET in 2019. REVIEW OF SYSTEMS       Review of Systems   Constitutional:  Negative for chills and fever. HENT:  Negative for congestion, facial swelling and nosebleeds. Eyes:  Negative for photophobia, pain and visual disturbance. Respiratory:  Negative for cough and shortness of breath. Cardiovascular:  Positive for leg swelling (intermittent). Negative for chest pain and palpitations. Gastrointestinal:  Negative for abdominal pain, diarrhea, nausea and vomiting. Genitourinary:  Negative for difficulty urinating, dysuria, frequency, hematuria and urgency. Musculoskeletal:  Positive for back pain and neck pain. Negative for myalgias. Skin:  Positive for wound (abrasion to forehead; occipital head). Negative for rash. Neurological:  Positive for dizziness, syncope, weakness (generalized) and light-headedness. Negative for tremors, seizures, facial asymmetry, speech difficulty, numbness and headaches. Psychiatric/Behavioral:  Negative for confusion. Except as noted above the remainder of the review of systems was reviewed and negative. PAST MEDICAL HISTORY     Past Medical History:   Diagnosis Date    Hypertension     Hypogonadism male     Type II or unspecified type diabetes mellitus without mention of complication, not stated as uncontrolled          SURGICAL HISTORY       Past Surgical History:   Procedure Laterality Date    US THYROID BIOPSY  11/24/2020     THYROID BIOPSY 11/24/2020 MLOX RAD VASC INTERVNL         CURRENT MEDICATIONS       Previous Medications    ACCU-CHEK FASTCLIX LANCETS MISC    Pt test 4x daily    AMLODIPINE (NORVASC) 10 MG TABLET    Take 10 mg by mouth daily    ASPIRIN LOW DOSE 81 MG CHEWABLE TABLET    TAKE 1 TABLET BY MOUTH EVERY DAY    ATORVASTATIN (LIPITOR) 80 MG TABLET    Take 1 tablet by mouth nightly    BLOOD GLUCOSE TEST STRIPS (ASCENSIA AUTODISC VI;ONE TOUCH ULTRA TEST VI) STRIP    1 each by In Vitro route 4 times daily DX: E11.65, IDDM (please dispense One Touch Verio brand)    CETIRIZINE (ZYRTEC) 10 MG TABLET    Take 10 mg by mouth daily    FLUTICASONE (FLONASE) 50 MCG/ACT NASAL SPRAY    1 spray by Nasal route nightly     FUROSEMIDE (LASIX) 20 MG TABLET    Take 20 mg by mouth 2 times daily     GABAPENTIN (NEURONTIN) 100 MG CAPSULE    Take 200 mg by mouth 2 times daily.     INSULIN DEGLUDEC (TRESIBA FLEXTOUCH) 200 UNIT/ML SOPN    INJECT  UNITS AT BEDTIME    INSULIN PEN NEEDLE (B-D ULTRAFINE III SHORT PEN) 31G X 8 MM MISC    USE THREE TIMES DAILY WITH INSULIN    INSULIN REGULAR (NOVOLIN R) 100 UNIT/ML INJECTION    INJECT 30 UNITS 3 TIMES DAILY    INSULIN SYRINGE-NEEDLE U-100 (INSULIN SYRINGE .3CC/31GX5/16\") 31G X 5/16\" 0.3 ML MISC    Use 3 daily with insulin LISINOPRIL (PRINIVIL;ZESTRIL) 20 MG TABLET    Take 1 tablet by mouth daily. METFORMIN (GLUCOPHAGE) 1000 MG TABLET    TAKE 1 TABLET BY MOUTH TWICE A DAY    POLYETHYLENE GLYCOL (GLYCOLAX) 17 G PACKET    Take 17 g by mouth daily as needed for Constipation    TAMSULOSIN (FLOMAX) 0.4 MG CAPSULE    Take 0.4 mg by mouth Daily with supper     VITAMIN D (CHOLECALCIFEROL) 125 MCG (5000 UT) CAPS CAPSULE    Take 5,000 Units by mouth daily       ALLERGIES     Patient has no known allergies. FAMILY HISTORY     No family history on file. SOCIAL HISTORY       Social History     Socioeconomic History    Marital status:    Tobacco Use    Smoking status: Former     Types: Cigarettes     Quit date: 1975     Years since quittin.7    Smokeless tobacco: Never         PHYSICAL EXAM        ED Triage Vitals   BP Temp Temp Source Heart Rate Resp SpO2 Height Weight   22 1753 22 1753 22 1804 22 1804 22 1753 22 1753 22 1804 22 1804   (!) 155/79 98.4 °F (36.9 °C) Oral (!) 33 20 99 % 5' 11.75\" (1.822 m) 275 lb (124.7 kg)       Physical Exam  Constitutional:       General: He is not in acute distress. Appearance: He is ill-appearing and diaphoretic. HENT:      Head: Normocephalic. Abrasion present. No raccoon eyes, Estevez's sign, right periorbital erythema or left periorbital erythema. Jaw: There is normal jaw occlusion. Comments: No facial bone tenderness, palpable deformity, crepitus. Right Ear: External ear normal.      Left Ear: External ear normal.      Nose: Nose normal. No nasal deformity, signs of injury, nasal tenderness, congestion or rhinorrhea. Mouth/Throat:      Mouth: Mucous membranes are moist. No injury. Pharynx: Oropharynx is clear. No oropharyngeal exudate or posterior oropharyngeal erythema. Eyes:      Extraocular Movements: Extraocular movements intact.       Conjunctiva/sclera: Conjunctivae normal.      Pupils: Pupils are equal, round, and reactive to light. Cardiovascular:      Rate and Rhythm: Normal rate and regular rhythm. Pulmonary:      Effort: Pulmonary effort is normal. No respiratory distress. Breath sounds: Normal breath sounds. No wheezing, rhonchi or rales. Chest:      Chest wall: No mass, deformity, swelling, tenderness or crepitus. Comments: No chest wall deformity, tenderness, bruising, wounds  Abdominal:      General: Bowel sounds are normal. There is no distension. Palpations: Abdomen is soft. There is no mass. Tenderness: There is no abdominal tenderness. There is no guarding or rebound. Hernia: No hernia is present. Comments: No abdominal or flank ecchymosis    Musculoskeletal:         General: No tenderness, deformity or signs of injury. Normal range of motion. Cervical back: Normal range of motion. Spinous process tenderness present. Right lower leg: Edema (2+) present. Left lower leg: Edema (2+) present. Skin:     General: Skin is warm. Capillary Refill: Capillary refill takes less than 2 seconds. Coloration: Skin is pale. Findings: No bruising or lesion. Neurological:      General: No focal deficit present. Mental Status: He is alert and oriented to person, place, and time.    Psychiatric:         Mood and Affect: Mood normal.         Behavior: Behavior normal.         LABS:  Labs Reviewed   COMPREHENSIVE METABOLIC PANEL - Abnormal; Notable for the following components:       Result Value    Glucose 123 (*)     BUN 34 (*)     Creatinine 1.36 (*)     GFR Non- 52.0 (*)     All other components within normal limits    Narrative:     Ada Santo tel. [phone],  trop results called to and read back by Tai Mcmullen, 09/25/2022 19:45,  by [facility]   CBC WITH AUTO DIFFERENTIAL - Abnormal; Notable for the following components:    RDW 15.5 (*)     All other components within normal limits   TROPONIN - Abnormal; Notable for the following components:    Troponin 0.015 (*)     All other components within normal limits    Narrative:     Ivan Abdiaziz tel. [phone],  trop results called to and read back by Blayne Shineworth, 09/25/2022 19:45,  [facility]   TSH WITH REFLEX - Abnormal; Notable for the following components:    TSH 4.380 (*)     All other components within normal limits   URINALYSIS WITH REFLEX TO CULTURE - Abnormal; Notable for the following components:    Glucose, Ur >=1000 (*)     All other components within normal limits   POCT CREATININE - Normal   MAGNESIUM    Narrative:     [employer] tel. [phone],  trop results called to and read back by Blayne Shineworth, 09/25/2022 19:45,  by [facility]    Narrative:     Ivan Abdiaziz tel. [phone],  trop results called to and read back by Blayne Shineworth, 09/25/2022 19:45,  by [facility]   APTT   LACTIC ACID   CK   T4, FREE     1807 EKG sinus rhythm HR 29 3rd degree heart block bifascicular block present    2003 EKG sinus rhythm HR 67 1st degree AVB bifascicular block    MDM:   Vitals:    Vitals:    09/25/22 1950 09/25/22 2000 09/25/22 2050 09/25/22 2100   BP:  (!) 150/69 (!) 160/64 (!) 167/59   Pulse: 68 62 67 67   Resp: 12 16 18 14   Temp:       TempSrc:       SpO2: 97% 98% 99% 97%   Weight:       Height:           68-year-old male patient presents emergency department for evaluation of 2 syncopal episodes prior to arrival.  Patient sustained head injury with with episodes. Presents pale, diaphoretic, noted to be bradycardic in third-degree heart block on EKG. Normotensive. Patient placed on pacing pads on arrival.  With normotensive blood pressure no interventions at this time. Trauma assessment remarkable for abrasions to both his forehead and his occipital head. He is also reporting midline neck and back pain. No other obvious evidence of acute injury on examination.   Trauma work-up was performed, remarkable for small acute subdural hematoma within the right interhemispheric fissure measuring 3.5 mm. Patient not presently anticoagulated. No focal neurologic deficits, fully alert and oriented. CT cervical, thoracic, lumbar spines without acute trauma findings. After returning from CT patient is noted on cardiac monitoring to no longer be in third-degree heart block. Repeat EKG is performed which demonstrates first-degree heart block, heart rate 67. Patient states he is feeling better and does appear improved on examination. Remains in 1st degree AVB for remainder of ED stay. Also on examination patient has 2+ pitting edema bilaterally, he states he intermittently struggles with this and it is within his baseline. CT chest does not demonstrate acute overload. BNP normal.  Troponin 0.015 consistent with baseline. He is not hypoxic or short of breath. No increased work of breathing. Patient remains in first-degree heart block heart rate 60s to 70s. Decision to transfer to tertiary facility for management with interventional cardiology and trauma/neurosurgery services. Long discussion with family regarding transfer, education provided. Family prefers [facility] transfer d/t pre-existing relationship. Spoke with Dr. Judge Fisher ED physician at [facility] will admit for ED to ED transfer. Critical care transport. Also independently evaluated by Dr. Sanjuanita Lauren in the emergency department. CRITICAL CARE TIME   Total CriticalCare time was 37 minutes, excluding separately reportable procedures. There was a high probability of clinically significant/life threatening deterioration in the patient's condition which required my urgent intervention. PROCEDURES:  Unlessotherwise noted below, none      Procedures      FINAL IMPRESSION      1. Syncope and collapse    2.  Third degree heart block (HCC)    3. Subdural hematoma [facility])          DISPOSITION/PLAN   DISPOSITION Decision To Transfer 09/25/2022 06:22:14 PM          YADIEL Lopes (electronically signed)  Attending Emergency Physician          Solange Lopesma  09/25/22 [id number]

## 2022-09-26 LAB
EKG ATRIAL RATE: 67 BPM
EKG ATRIAL RATE: 89 BPM
EKG P AXIS: 60 DEGREES
EKG P-R INTERVAL: 262 MS
EKG Q-T INTERVAL: 462 MS
EKG Q-T INTERVAL: 588 MS
EKG QRS DURATION: 134 MS
EKG QRS DURATION: 142 MS
EKG QTC CALCULATION (BAZETT): 408 MS
EKG QTC CALCULATION (BAZETT): 488 MS
EKG R AXIS: -60 DEGREES
EKG R AXIS: -62 DEGREES
EKG T AXIS: 38 DEGREES
EKG T AXIS: 8 DEGREES
EKG VENTRICULAR RATE: 29 BPM
EKG VENTRICULAR RATE: 67 BPM

## 2022-09-26 PROCEDURE — 93010 ELECTROCARDIOGRAM REPORT: CPT | Performed by: INTERNAL MEDICINE

## 2022-09-26 NOTE — ED NOTES
Dr. Cady Naqvi and YADIEL luz at bedside to speak with patient and family.        Franny Alcala RN  09/25/22 2025

## 2022-09-28 LAB
GFR AFRICAN AMERICAN: >60
GFR NON-AFRICAN AMERICAN: 50
PERFORMED ON: ABNORMAL
POC CREATININE: 1.4 MG/DL (ref 0.8–1.3)
POC SAMPLE TYPE: ABNORMAL

## 2023-08-30 NOTE — ED NOTES
Per patient and wife patient \"will not\" go to [facility] for transport. Providers informed.       Talya Snowden RN  09/25/22 2012 Topical Sulfur Applications Pregnancy And Lactation Text: This medication is considered safe during pregnancy and breast feeding secondary to limited systemic absorption.

## 2023-09-04 RX ORDER — ASPIRIN 81 MG
TABLET,CHEWABLE ORAL
Qty: 30 TABLET | Refills: 0 | Status: SHIPPED | OUTPATIENT
Start: 2023-09-04

## 2023-09-04 NOTE — TELEPHONE ENCOUNTER
30 day refill given. Patient needs OV. Requesting medication refill. Rx requested:  Requested Prescriptions     Pending Prescriptions Disp Refills    ASPIRIN LOW DOSE 81 MG chewable tablet [Pharmacy Med Name: ASPIRIN 81 MG CHEWABLE TABLET] 90 tablet 3     Sig: TAKE 1 TABLET BY MOUTH EVERY DAY         Last Office Visit:   05/04/2021      Next Visit Date:  No future appointments. Last refill 09/13/2022.

## 2023-09-29 RX ORDER — ASPIRIN 81 MG
TABLET,CHEWABLE ORAL
Qty: 30 TABLET | Refills: 0 | OUTPATIENT
Start: 2023-09-29

## 2024-03-19 RX ORDER — ASPIRIN 81 MG/1
81 TABLET, CHEWABLE ORAL DAILY
Qty: 30 TABLET | Refills: 0 | OUTPATIENT
Start: 2024-03-19

## 2024-03-19 NOTE — TELEPHONE ENCOUNTER
Requesting medication refill. Please approve or deny this request.    Rx requested:  Requested Prescriptions     Pending Prescriptions Disp Refills    aspirin (ASPIRIN LOW DOSE) 81 MG chewable tablet 30 tablet 0     Sig: Take 1 tablet by mouth daily         Last Office Visit:     9/20/22    Next Visit Date:  No future appointments.            Please approve or deny.

## 2024-06-03 RX ORDER — ASPIRIN 81 MG/1
81 TABLET, CHEWABLE ORAL DAILY
Qty: 30 TABLET | Refills: 0 | OUTPATIENT
Start: 2024-06-03

## 2024-06-03 NOTE — TELEPHONE ENCOUNTER
Tried to call pt [facility] for appt   Requesting medication refill. Please approve or deny this request.    Rx requested:  Requested Prescriptions     Pending Prescriptions Disp Refills    aspirin (ASPIRIN LOW DOSE) 81 MG chewable tablet 30 tablet 0     Sig: Take 1 tablet by mouth daily         Last Office Visit:   Visit date not found      Next Visit Date:  No future appointments.